# Patient Record
Sex: FEMALE | Race: WHITE | NOT HISPANIC OR LATINO | Employment: FULL TIME | URBAN - METROPOLITAN AREA
[De-identification: names, ages, dates, MRNs, and addresses within clinical notes are randomized per-mention and may not be internally consistent; named-entity substitution may affect disease eponyms.]

---

## 2019-02-25 ENCOUNTER — OFFICE VISIT - HEALTHEAST (OUTPATIENT)
Dept: FAMILY MEDICINE | Facility: CLINIC | Age: 39
End: 2019-02-25

## 2019-02-25 DIAGNOSIS — N94.6 DYSMENORRHEA: ICD-10-CM

## 2019-02-25 DIAGNOSIS — Z00.00 ROUTINE GENERAL MEDICAL EXAMINATION AT A HEALTH CARE FACILITY: ICD-10-CM

## 2019-02-25 DIAGNOSIS — R03.0 ELEVATED BLOOD PRESSURE READING WITHOUT DIAGNOSIS OF HYPERTENSION: ICD-10-CM

## 2019-02-25 DIAGNOSIS — Z83.3 FAMILY HISTORY OF DIABETES MELLITUS: ICD-10-CM

## 2019-02-25 DIAGNOSIS — Z12.4 CERVICAL CANCER SCREENING: ICD-10-CM

## 2019-02-25 DIAGNOSIS — R10.2 PELVIC PAIN: ICD-10-CM

## 2019-02-25 DIAGNOSIS — Z11.3 SCREEN FOR STD (SEXUALLY TRANSMITTED DISEASE): ICD-10-CM

## 2019-02-25 LAB
ALBUMIN SERPL-MCNC: 4.3 G/DL (ref 3.5–5)
ALP SERPL-CCNC: 79 U/L (ref 45–120)
ALT SERPL W P-5'-P-CCNC: 12 U/L (ref 0–45)
ANION GAP SERPL CALCULATED.3IONS-SCNC: 12 MMOL/L (ref 5–18)
AST SERPL W P-5'-P-CCNC: 12 U/L (ref 0–40)
BILIRUB SERPL-MCNC: 0.4 MG/DL (ref 0–1)
BUN SERPL-MCNC: 20 MG/DL (ref 8–22)
CALCIUM SERPL-MCNC: 10 MG/DL (ref 8.5–10.5)
CHLORIDE BLD-SCNC: 105 MMOL/L (ref 98–107)
CO2 SERPL-SCNC: 22 MMOL/L (ref 22–31)
CREAT SERPL-MCNC: 0.72 MG/DL (ref 0.6–1.1)
GFR SERPL CREATININE-BSD FRML MDRD: >60 ML/MIN/1.73M2
GLUCOSE BLD-MCNC: 81 MG/DL (ref 70–125)
HBA1C MFR BLD: 5.3 % (ref 3.5–6)
HIV 1+2 AB+HIV1 P24 AG SERPL QL IA: NEGATIVE
POTASSIUM BLD-SCNC: 4 MMOL/L (ref 3.5–5)
PROT SERPL-MCNC: 7.6 G/DL (ref 6–8)
SODIUM SERPL-SCNC: 139 MMOL/L (ref 136–145)
TSH SERPL DL<=0.005 MIU/L-ACNC: 1.43 UIU/ML (ref 0.3–5)

## 2019-02-25 ASSESSMENT — MIFFLIN-ST. JEOR: SCORE: 1562.86

## 2019-02-26 LAB
C TRACH DNA SPEC QL PROBE+SIG AMP: NEGATIVE
HPV SOURCE: NORMAL
HUMAN PAPILLOMA VIRUS 16 DNA: NEGATIVE
HUMAN PAPILLOMA VIRUS 18 DNA: NEGATIVE
HUMAN PAPILLOMA VIRUS FINAL DIAGNOSIS: NORMAL
HUMAN PAPILLOMA VIRUS OTHER HR: NEGATIVE
N GONORRHOEA DNA SPEC QL NAA+PROBE: NEGATIVE
SPECIMEN DESCRIPTION: NORMAL
T PALLIDUM AB SER QL: NEGATIVE

## 2019-02-27 LAB
HAV IGM SERPL QL IA: NEGATIVE
HBV CORE IGM SERPL QL IA: NEGATIVE
HBV SURFACE AG SERPL QL IA: NEGATIVE
HCV AB SERPL QL IA: NEGATIVE
HSV1 IGG SERPL QL IA: NEGATIVE
HSV2 IGG SERPL QL IA: NEGATIVE

## 2019-03-01 LAB
BKR LAB AP ABNORMAL BLEEDING: YES
BKR LAB AP BIRTH CONTROL/HORMONES: NORMAL
BKR LAB AP CERVICAL APPEARANCE: NORMAL
BKR LAB AP GYN ADEQUACY: NORMAL
BKR LAB AP GYN INTERPRETATION: NORMAL
BKR LAB AP HPV REFLEX: NORMAL
BKR LAB AP LMP: NORMAL
BKR LAB AP PATIENT STATUS: NORMAL
BKR LAB AP PREVIOUS ABNORMAL: NORMAL
BKR LAB AP PREVIOUS NORMAL: NORMAL
HIGH RISK?: YES
PATH REPORT.COMMENTS IMP SPEC: NORMAL
RESULT FLAG (HE HISTORICAL CONVERSION): NORMAL

## 2019-06-06 ENCOUNTER — OFFICE VISIT - HEALTHEAST (OUTPATIENT)
Dept: FAMILY MEDICINE | Facility: CLINIC | Age: 39
End: 2019-06-06

## 2019-06-06 DIAGNOSIS — M25.562 ACUTE PAIN OF LEFT KNEE: ICD-10-CM

## 2019-06-06 RX ORDER — IBUPROFEN 200 MG
200-400 TABLET ORAL
Status: SHIPPED | COMMUNITY
Start: 2019-06-06 | End: 2022-06-29

## 2019-06-06 ASSESSMENT — MIFFLIN-ST. JEOR: SCORE: 1552.08

## 2019-08-06 ENCOUNTER — HOSPITAL ENCOUNTER (OUTPATIENT)
Dept: ULTRASOUND IMAGING | Facility: HOSPITAL | Age: 39
Discharge: HOME | End: 2019-08-06
Attending: NURSE PRACTITIONER

## 2019-08-06 DIAGNOSIS — R10.2 PELVIC PAIN: ICD-10-CM

## 2019-11-14 ENCOUNTER — COMMUNICATION - HEALTHEAST (OUTPATIENT)
Dept: FAMILY MEDICINE | Facility: CLINIC | Age: 39
End: 2019-11-14

## 2019-11-14 DIAGNOSIS — N94.6 DYSMENORRHEA: ICD-10-CM

## 2019-11-23 ENCOUNTER — COMMUNICATION - HEALTHEAST (OUTPATIENT)
Dept: TELEHEALTH | Facility: CLINIC | Age: 39
End: 2019-11-23

## 2020-01-02 ENCOUNTER — OFFICE VISIT - HEALTHEAST (OUTPATIENT)
Dept: FAMILY MEDICINE | Facility: CLINIC | Age: 40
End: 2020-01-02

## 2020-01-02 DIAGNOSIS — Z00.00 ROUTINE GENERAL MEDICAL EXAMINATION AT A HEALTH CARE FACILITY: ICD-10-CM

## 2020-01-02 DIAGNOSIS — Z13.1 SCREENING FOR DIABETES MELLITUS: ICD-10-CM

## 2020-01-02 DIAGNOSIS — Z13.0 SCREENING FOR DEFICIENCY ANEMIA: ICD-10-CM

## 2020-01-02 DIAGNOSIS — F33.1 MODERATE EPISODE OF RECURRENT MAJOR DEPRESSIVE DISORDER (H): ICD-10-CM

## 2020-01-02 DIAGNOSIS — Z13.220 SCREENING FOR CHOLESTEROL LEVEL: ICD-10-CM

## 2020-01-02 DIAGNOSIS — F41.1 GAD (GENERALIZED ANXIETY DISORDER): ICD-10-CM

## 2020-01-02 DIAGNOSIS — N94.6 DYSMENORRHEA: ICD-10-CM

## 2020-01-02 LAB
CHOLEST SERPL-MCNC: 248 MG/DL
FASTING STATUS PATIENT QL REPORTED: YES
FASTING STATUS PATIENT QL REPORTED: YES
GLUCOSE BLD-MCNC: 87 MG/DL (ref 70–99)
HDLC SERPL-MCNC: 54 MG/DL
HGB BLD-MCNC: 13 G/DL (ref 12–16)
LDLC SERPL CALC-MCNC: 155 MG/DL
TRIGL SERPL-MCNC: 196 MG/DL

## 2020-01-02 ASSESSMENT — ANXIETY QUESTIONNAIRES
2. NOT BEING ABLE TO STOP OR CONTROL WORRYING: MORE THAN HALF THE DAYS
1. FEELING NERVOUS, ANXIOUS, OR ON EDGE: NEARLY EVERY DAY
3. WORRYING TOO MUCH ABOUT DIFFERENT THINGS: NEARLY EVERY DAY
7. FEELING AFRAID AS IF SOMETHING AWFUL MIGHT HAPPEN: MORE THAN HALF THE DAYS
GAD7 TOTAL SCORE: 16
6. BECOMING EASILY ANNOYED OR IRRITABLE: NEARLY EVERY DAY
IF YOU CHECKED OFF ANY PROBLEMS ON THIS QUESTIONNAIRE, HOW DIFFICULT HAVE THESE PROBLEMS MADE IT FOR YOU TO DO YOUR WORK, TAKE CARE OF THINGS AT HOME, OR GET ALONG WITH OTHER PEOPLE: SOMEWHAT DIFFICULT
4. TROUBLE RELAXING: NEARLY EVERY DAY
5. BEING SO RESTLESS THAT IT IS HARD TO SIT STILL: NOT AT ALL

## 2020-01-02 ASSESSMENT — PATIENT HEALTH QUESTIONNAIRE - PHQ9: SUM OF ALL RESPONSES TO PHQ QUESTIONS 1-9: 9

## 2020-01-02 ASSESSMENT — MIFFLIN-ST. JEOR: SCORE: 1554.35

## 2020-02-06 ENCOUNTER — OFFICE VISIT - HEALTHEAST (OUTPATIENT)
Dept: FAMILY MEDICINE | Facility: CLINIC | Age: 40
End: 2020-02-06

## 2020-02-06 DIAGNOSIS — F33.1 MODERATE EPISODE OF RECURRENT MAJOR DEPRESSIVE DISORDER (H): ICD-10-CM

## 2020-02-06 DIAGNOSIS — Z87.42 HISTORY OF PCOS: ICD-10-CM

## 2020-02-06 DIAGNOSIS — F41.1 GAD (GENERALIZED ANXIETY DISORDER): ICD-10-CM

## 2020-02-06 ASSESSMENT — ANXIETY QUESTIONNAIRES
7. FEELING AFRAID AS IF SOMETHING AWFUL MIGHT HAPPEN: NOT AT ALL
2. NOT BEING ABLE TO STOP OR CONTROL WORRYING: SEVERAL DAYS
1. FEELING NERVOUS, ANXIOUS, OR ON EDGE: SEVERAL DAYS
4. TROUBLE RELAXING: SEVERAL DAYS
IF YOU CHECKED OFF ANY PROBLEMS ON THIS QUESTIONNAIRE, HOW DIFFICULT HAVE THESE PROBLEMS MADE IT FOR YOU TO DO YOUR WORK, TAKE CARE OF THINGS AT HOME, OR GET ALONG WITH OTHER PEOPLE: NOT DIFFICULT AT ALL
3. WORRYING TOO MUCH ABOUT DIFFERENT THINGS: SEVERAL DAYS
6. BECOMING EASILY ANNOYED OR IRRITABLE: SEVERAL DAYS
GAD7 TOTAL SCORE: 5
5. BEING SO RESTLESS THAT IT IS HARD TO SIT STILL: NOT AT ALL

## 2020-02-06 ASSESSMENT — PATIENT HEALTH QUESTIONNAIRE - PHQ9: SUM OF ALL RESPONSES TO PHQ QUESTIONS 1-9: 4

## 2020-02-19 ENCOUNTER — COMMUNICATION - HEALTHEAST (OUTPATIENT)
Dept: FAMILY MEDICINE | Facility: CLINIC | Age: 40
End: 2020-02-19

## 2020-02-19 DIAGNOSIS — F33.1 MODERATE EPISODE OF RECURRENT MAJOR DEPRESSIVE DISORDER (H): ICD-10-CM

## 2020-02-19 DIAGNOSIS — F41.1 GAD (GENERALIZED ANXIETY DISORDER): ICD-10-CM

## 2020-03-16 ENCOUNTER — COMMUNICATION - HEALTHEAST (OUTPATIENT)
Dept: FAMILY MEDICINE | Facility: CLINIC | Age: 40
End: 2020-03-16

## 2020-03-16 DIAGNOSIS — F41.1 GAD (GENERALIZED ANXIETY DISORDER): ICD-10-CM

## 2020-03-16 DIAGNOSIS — F33.1 MODERATE EPISODE OF RECURRENT MAJOR DEPRESSIVE DISORDER (H): ICD-10-CM

## 2020-03-23 ENCOUNTER — COMMUNICATION - HEALTHEAST (OUTPATIENT)
Dept: FAMILY MEDICINE | Facility: CLINIC | Age: 40
End: 2020-03-23

## 2020-03-23 DIAGNOSIS — F33.1 MODERATE EPISODE OF RECURRENT MAJOR DEPRESSIVE DISORDER (H): ICD-10-CM

## 2020-03-23 DIAGNOSIS — F41.1 GAD (GENERALIZED ANXIETY DISORDER): ICD-10-CM

## 2020-03-30 ENCOUNTER — OFFICE VISIT - HEALTHEAST (OUTPATIENT)
Dept: FAMILY MEDICINE | Facility: CLINIC | Age: 40
End: 2020-03-30

## 2020-03-30 DIAGNOSIS — F41.1 GAD (GENERALIZED ANXIETY DISORDER): ICD-10-CM

## 2020-03-30 DIAGNOSIS — F51.04 PSYCHOPHYSIOLOGICAL INSOMNIA: ICD-10-CM

## 2020-03-30 DIAGNOSIS — F33.1 MODERATE EPISODE OF RECURRENT MAJOR DEPRESSIVE DISORDER (H): ICD-10-CM

## 2020-03-30 ASSESSMENT — ANXIETY QUESTIONNAIRES
3. WORRYING TOO MUCH ABOUT DIFFERENT THINGS: NOT AT ALL
1. FEELING NERVOUS, ANXIOUS, OR ON EDGE: SEVERAL DAYS
6. BECOMING EASILY ANNOYED OR IRRITABLE: SEVERAL DAYS
2. NOT BEING ABLE TO STOP OR CONTROL WORRYING: NOT AT ALL
7. FEELING AFRAID AS IF SOMETHING AWFUL MIGHT HAPPEN: SEVERAL DAYS
4. TROUBLE RELAXING: NOT AT ALL
5. BEING SO RESTLESS THAT IT IS HARD TO SIT STILL: NOT AT ALL
GAD7 TOTAL SCORE: 3
IF YOU CHECKED OFF ANY PROBLEMS ON THIS QUESTIONNAIRE, HOW DIFFICULT HAVE THESE PROBLEMS MADE IT FOR YOU TO DO YOUR WORK, TAKE CARE OF THINGS AT HOME, OR GET ALONG WITH OTHER PEOPLE: NOT DIFFICULT AT ALL

## 2020-03-30 ASSESSMENT — PATIENT HEALTH QUESTIONNAIRE - PHQ9: SUM OF ALL RESPONSES TO PHQ QUESTIONS 1-9: 2

## 2020-06-15 ENCOUNTER — COMMUNICATION - HEALTHEAST (OUTPATIENT)
Dept: FAMILY MEDICINE | Facility: CLINIC | Age: 40
End: 2020-06-15

## 2020-06-15 DIAGNOSIS — F41.1 GAD (GENERALIZED ANXIETY DISORDER): ICD-10-CM

## 2020-06-15 DIAGNOSIS — F33.1 MODERATE EPISODE OF RECURRENT MAJOR DEPRESSIVE DISORDER (H): ICD-10-CM

## 2020-06-16 ENCOUNTER — COMMUNICATION - HEALTHEAST (OUTPATIENT)
Dept: FAMILY MEDICINE | Facility: CLINIC | Age: 40
End: 2020-06-16

## 2020-06-16 DIAGNOSIS — F41.1 GAD (GENERALIZED ANXIETY DISORDER): ICD-10-CM

## 2020-06-16 DIAGNOSIS — F33.1 MODERATE EPISODE OF RECURRENT MAJOR DEPRESSIVE DISORDER (H): ICD-10-CM

## 2020-06-17 ENCOUNTER — COMMUNICATION - HEALTHEAST (OUTPATIENT)
Dept: FAMILY MEDICINE | Facility: CLINIC | Age: 40
End: 2020-06-17

## 2020-06-17 DIAGNOSIS — F41.1 GAD (GENERALIZED ANXIETY DISORDER): ICD-10-CM

## 2020-06-17 DIAGNOSIS — F33.1 MODERATE EPISODE OF RECURRENT MAJOR DEPRESSIVE DISORDER (H): ICD-10-CM

## 2020-10-11 ENCOUNTER — COMMUNICATION - HEALTHEAST (OUTPATIENT)
Dept: FAMILY MEDICINE | Facility: CLINIC | Age: 40
End: 2020-10-11

## 2020-10-11 DIAGNOSIS — N94.6 DYSMENORRHEA: ICD-10-CM

## 2020-12-17 ENCOUNTER — COMMUNICATION - HEALTHEAST (OUTPATIENT)
Dept: FAMILY MEDICINE | Facility: CLINIC | Age: 40
End: 2020-12-17

## 2020-12-17 DIAGNOSIS — F51.04 PSYCHOPHYSIOLOGICAL INSOMNIA: ICD-10-CM

## 2020-12-18 RX ORDER — TRAZODONE HYDROCHLORIDE 50 MG/1
TABLET, FILM COATED ORAL
Qty: 90 TABLET | Refills: 0 | Status: SHIPPED | OUTPATIENT
Start: 2020-12-18 | End: 2022-06-16

## 2021-02-22 ENCOUNTER — COMMUNICATION - HEALTHEAST (OUTPATIENT)
Dept: FAMILY MEDICINE | Facility: CLINIC | Age: 41
End: 2021-02-22

## 2021-02-22 DIAGNOSIS — R93.7 ABNORMAL X-RAY OF PELVIS: ICD-10-CM

## 2021-03-03 ENCOUNTER — HOSPITAL ENCOUNTER (OUTPATIENT)
Dept: RADIOLOGY | Facility: HOSPITAL | Age: 41
Discharge: HOME | End: 2021-03-03
Attending: NURSE PRACTITIONER

## 2021-03-03 DIAGNOSIS — R93.7 ABNORMAL X-RAY OF PELVIS: ICD-10-CM

## 2021-03-09 ENCOUNTER — COMMUNICATION - HEALTHEAST (OUTPATIENT)
Dept: FAMILY MEDICINE | Facility: CLINIC | Age: 41
End: 2021-03-09

## 2021-03-09 ENCOUNTER — COMMUNICATION - HEALTHEAST (OUTPATIENT)
Dept: INTERNAL MEDICINE | Facility: CLINIC | Age: 41
End: 2021-03-09

## 2021-03-09 DIAGNOSIS — F41.1 GAD (GENERALIZED ANXIETY DISORDER): ICD-10-CM

## 2021-03-09 DIAGNOSIS — F33.1 MODERATE EPISODE OF RECURRENT MAJOR DEPRESSIVE DISORDER (H): ICD-10-CM

## 2021-03-11 ENCOUNTER — COMMUNICATION - HEALTHEAST (OUTPATIENT)
Dept: FAMILY MEDICINE | Facility: CLINIC | Age: 41
End: 2021-03-11

## 2021-03-11 DIAGNOSIS — F41.1 GAD (GENERALIZED ANXIETY DISORDER): ICD-10-CM

## 2021-03-11 DIAGNOSIS — F33.1 MODERATE EPISODE OF RECURRENT MAJOR DEPRESSIVE DISORDER (H): ICD-10-CM

## 2021-03-24 ENCOUNTER — COMMUNICATION - HEALTHEAST (OUTPATIENT)
Dept: FAMILY MEDICINE | Facility: CLINIC | Age: 41
End: 2021-03-24

## 2021-03-24 DIAGNOSIS — N94.6 DYSMENORRHEA: ICD-10-CM

## 2021-04-29 ENCOUNTER — OFFICE VISIT - HEALTHEAST (OUTPATIENT)
Dept: FAMILY MEDICINE | Facility: CLINIC | Age: 41
End: 2021-04-29

## 2021-04-29 DIAGNOSIS — E66.09 OBESITY DUE TO EXCESS CALORIES WITHOUT SERIOUS COMORBIDITY, UNSPECIFIED CLASSIFICATION: ICD-10-CM

## 2021-04-29 DIAGNOSIS — Z12.31 ENCOUNTER FOR SCREENING MAMMOGRAM FOR BREAST CANCER: ICD-10-CM

## 2021-04-29 DIAGNOSIS — N94.6 DYSMENORRHEA: ICD-10-CM

## 2021-04-29 DIAGNOSIS — F41.1 GAD (GENERALIZED ANXIETY DISORDER): ICD-10-CM

## 2021-04-29 DIAGNOSIS — F33.1 MODERATE EPISODE OF RECURRENT MAJOR DEPRESSIVE DISORDER (H): ICD-10-CM

## 2021-04-29 RX ORDER — DESOGESTREL AND ETHINYL ESTRADIOL 0.15-0.03
KIT ORAL
Qty: 84 TABLET | Refills: 3 | Status: SHIPPED | OUTPATIENT
Start: 2021-04-29 | End: 2021-08-05

## 2021-04-29 RX ORDER — SERTRALINE HYDROCHLORIDE 25 MG/1
75 TABLET, FILM COATED ORAL DAILY
Qty: 270 TABLET | Refills: 3 | Status: SHIPPED | OUTPATIENT
Start: 2021-04-29 | End: 2022-06-16

## 2021-04-29 ASSESSMENT — PATIENT HEALTH QUESTIONNAIRE - PHQ9: SUM OF ALL RESPONSES TO PHQ QUESTIONS 1-9: 4

## 2021-04-30 ENCOUNTER — COMMUNICATION - HEALTHEAST (OUTPATIENT)
Dept: FAMILY MEDICINE | Facility: CLINIC | Age: 41
End: 2021-04-30

## 2021-04-30 DIAGNOSIS — F33.1 MODERATE EPISODE OF RECURRENT MAJOR DEPRESSIVE DISORDER (H): ICD-10-CM

## 2021-04-30 DIAGNOSIS — E66.9 OBESITY WITHOUT SERIOUS COMORBIDITY, UNSPECIFIED CLASSIFICATION, UNSPECIFIED OBESITY TYPE: ICD-10-CM

## 2021-05-01 RX ORDER — BUPROPION HYDROCHLORIDE 150 MG/1
150 TABLET ORAL DAILY
Qty: 30 TABLET | Refills: 1 | Status: SHIPPED | OUTPATIENT
Start: 2021-05-01 | End: 2022-06-16

## 2021-05-27 ENCOUNTER — COMMUNICATION - HEALTHEAST (OUTPATIENT)
Dept: FAMILY MEDICINE | Facility: CLINIC | Age: 41
End: 2021-05-27

## 2021-05-27 DIAGNOSIS — E66.9 OBESITY WITHOUT SERIOUS COMORBIDITY, UNSPECIFIED CLASSIFICATION, UNSPECIFIED OBESITY TYPE: ICD-10-CM

## 2021-05-27 DIAGNOSIS — F33.1 MODERATE EPISODE OF RECURRENT MAJOR DEPRESSIVE DISORDER (H): ICD-10-CM

## 2021-05-27 ASSESSMENT — PATIENT HEALTH QUESTIONNAIRE - PHQ9
SUM OF ALL RESPONSES TO PHQ QUESTIONS 1-9: 4
SUM OF ALL RESPONSES TO PHQ QUESTIONS 1-9: 2
SUM OF ALL RESPONSES TO PHQ QUESTIONS 1-9: 9
SUM OF ALL RESPONSES TO PHQ QUESTIONS 1-9: 4

## 2021-05-28 ASSESSMENT — ANXIETY QUESTIONNAIRES
GAD7 TOTAL SCORE: 16
GAD7 TOTAL SCORE: 5
GAD7 TOTAL SCORE: 3

## 2021-06-02 VITALS — BODY MASS INDEX: 28.64 KG/M2 | HEIGHT: 68 IN | WEIGHT: 189 LBS

## 2021-06-03 VITALS — WEIGHT: 187.5 LBS | HEIGHT: 67 IN | BODY MASS INDEX: 29.43 KG/M2

## 2021-06-03 NOTE — TELEPHONE ENCOUNTER
Refill Approved    Rx renewed per Medication Renewal Policy. Medication was last renewed on 2/25/19.    Libra Ramirez, [facility] Triage/Med Refill 11/14/2019     Requested Prescriptions   Pending Prescriptions Disp Refills     desogestrel-ethinyl estradiol (RECLIPSEN, 28,) 0.15-0.03 mg per tablet [Pharmacy Med Name: RECLIPSEN 0.15MG-30MCG TABS 28] 112 tablet 0     Sig: TAKE 1 TABLET BY MOUTH ONCE DAILY. BEGIN ON SUNDAY AS DIRECTED. TAKE HORMONALLY ACTIVE TABLETS CONTINUOUSLY FOR 3 MONTHS AT A TIME.       Oral Contraceptives Protocol Passed - 11/14/2019  8:52 AM        Passed - Visit with PCP or prescribing provider visit in last 12 months      Last office visit with prescriber/PCP: 2/25/2019 Nikki High CNP OR same dept: 6/6/2019 Aditya Singletary MD OR same specialty: 6/6/2019 Aditya Singletary MD  Last physical: Visit date not found Last MTM visit: Visit date not found   Next visit within 3 mo: Visit date not found  Next physical within 3 mo: Visit date not found  Prescriber OR PCP: Nikki High CNP  Last diagnosis associated with med order: 1. Dysmenorrhea  - RECLIPSEN, 28, 0.15-0.03 mg per tablet [Pharmacy Med Name: RECLIPSEN 0.15MG-30MCG TABS 28]; TAKE 1 TABLET BY MOUTH ONCE DAILY. BEGIN ON SUNDAY AS DIRECTED. TAKE HORMONALLY ACTIVE TABLETS CONTINUOUSLY FOR 3 MONTHS AT A TIME.  Dispense: 112 tablet; Refill: 0    If protocol passes may refill for 12 months if within 3 months of last provider visit (or a total of 15 months).

## 2021-06-04 VITALS
BODY MASS INDEX: 28.18 KG/M2 | SYSTOLIC BLOOD PRESSURE: 114 MMHG | WEIGHT: 184 LBS | HEART RATE: 68 BPM | DIASTOLIC BLOOD PRESSURE: 76 MMHG

## 2021-06-04 VITALS
DIASTOLIC BLOOD PRESSURE: 90 MMHG | HEART RATE: 80 BPM | HEIGHT: 68 IN | SYSTOLIC BLOOD PRESSURE: 122 MMHG | WEIGHT: 186.25 LBS | BODY MASS INDEX: 28.23 KG/M2

## 2021-06-04 NOTE — PROGRESS NOTES
FEMALE PREVENTIVE EXAM    Assessment & Plan   1. Routine general medical examination at a health care facility  Not due for pap. Fasting labs, HPV #2.  [facility] colon cancer in PGM and paternal aunt later in life.  Discussed CRC screening at age 44 . She will check with father on his screening and if polyps, earlier screening.   - HPV vaccine 9 valent 3 dose IM    2. Moderate episode of recurrent major depressive disorder (H)  Long-standing depression and generalized anxiety.  Has followed with a therapist for two years and made many lifestyle changes without significant improvement . Interested in medication management and discussed options . Will start on sertraline 25mg for one week and increase to 50mg. Recheck in 4-6 weeks.   - sertraline (ZOLOFT) 50 MG tablet; Take 1 tablet (50 mg total) by mouth daily.  Dispense: 30 tablet; Refill: 1    3. CHARANJIT (generalized anxiety disorder)  - sertraline (ZOLOFT) 50 MG tablet; Take 1 tablet (50 mg total) by mouth daily.  Dispense: 30 tablet; Refill: 1    4. Dysmenorrhea  Denies risk factors for OCP use  - RECLIPSEN, 28, 0.15-0.03 mg per tablet; TAKE 1 TABLET BY MOUTH ONCE DAILY. BEGIN ON SUNDAY AS DIRECTED. TAKE HORMONALLY ACTIVE TABLETS CONTINUOUSLY FOR 3 MONTHS AT A TIME.  Dispense: 84 tablet; Refill: 3    5. Screening for cholesterol level  - Lipid Cascade    6. Screening for diabetes mellitus  - Glucose    7. Screening for deficiency anemia  - Hemoglobin    Recommend repeat pap smear if normal every five years, Recommended adequate calcium intake/osteoporosis prevention, Discussed breast cancer screening guidelines and Discussed diet, including moderation of portions sizes, avoiding eating out and fast food and increase in fruits and vegetables    Nikki High CNP    Subjective:   Chief Complaint:  Annual Exam (fasting - not due for a pap); Depression (would like to discuss possible medications); and Anxiety    HPI:  Eri Forbes is a 39 y.o. female who presents for  routine physical exam.    BP:  122/90. Previously elevated systolic as well.  Feels this is largely due to anxiety.     Depression/anxiety: Describes symptoms since adolescence.  Has essentially lived with this for years.  Persistent, pervasive. Feeling low, chronic anhedonia.  Low self esteem. Anxious throughout the day.  Inhibiting her from doing normal things.  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  No h/o panic disorder. Rare acute anxiety episodes.  No clayton or hypomania symptoms. Sleep difficulties occasionally.  Now maybe once a week. Following with a therapist regularly. [facility].  Cammie Beckham.  [facility] depression in father, anxiety in mother.     Used sertraline at age 19 for a brief period of time. Does not recall response. Stopped d/t insurance change.      Dysmenorrhea: Well controlled on OCP. Spotting with generics of Reclipsen.     OB/Gyn History:  Menstrual history: regular periods   Date of previous pap: 2019  History of abnormal pap: none  Current Contraceptive method: OCP    Preventive Health:  Reviewed and recommended screening and treatment recommendations:  Immunizations: due for HPV #2    Health Habits:    Exercise: not regularly  Calcium intake/Osteoporosis prevention: yes    PMH:   Patient Active Problem List   Diagnosis   (none) - all problems resolved or deleted       No past medical history on file.    Current Medications: Reviewed   Current Outpatient Medications on File Prior to Visit   Medication Sig Dispense Refill     desogestrel-ethinyl estradiol (RECLIPSEN, 28,) 0.15-0.03 mg per tablet TAKE 1 TABLET BY MOUTH ONCE DAILY. BEGIN ON SUNDAY AS DIRECTED. TAKE HORMONALLY ACTIVE TABLETS CONTINUOUSLY FOR 3 MONTHS AT A TIME. 84 tablet 3     ibuprofen (ADVIL,MOTRIN) 200 MG tablet Take 200-400 mg by mouth.       multivitamin therapeutic tablet Take 1 tablet by mouth daily.       No current  "facility-administered medications on file prior to visit.        Allergies:  Reviewed  has No Known Allergies.    Social History:  Social History     Occupational History     Not on file   Tobacco Use     Smoking status: Former Smoker     Smokeless tobacco: Never Used   Substance and Sexual Activity     Alcohol use: Yes     Alcohol/week: 0.0 - 1.0 standard drinks     Drug use: No     Sexual activity: Yes     Partners: Male       Family History:   Family History   Problem Relation Age of Onset     Multiple sclerosis Mother      Diabetes Father      Heart disease Father 55        s/p bypass     Diabetes Paternal Aunt      Diabetes Paternal Uncle      Diabetes Paternal Grandmother      Colon cancer Paternal Grandfather 80     Breast cancer Neg Hx      Cancer Neg Hx      Prostate cancer Neg Hx          Review of Systems:  Complete head to toe review of systems is otherwise negative except as above.    Objective:    /90 (Patient Site: Left Arm, Patient Position: Sitting, Cuff Size: Adult Large)   Pulse 80   Ht 5' 7.75\" (1.721 m)   Wt 186 lb 4 oz (84.5 kg)   LMP 11/29/2019 (Approximate)   Breastfeeding No   BMI 28.53 kg/m      GENERAL: Alert, well-appearing female   PSYCH: Pleasant mood, affect appropriate.  Good judgment and insight.  Intact recent and remote memory. Good eye contact.  SKIN: No atypical lesions  EYES: Conjunctiva pink, sclera white, no exudates. CLARA.  EOMs intact.   EARS: TMs pearly grey, no bulging, redness, retraction.   MOUTH: Pharynx moist, pink without exudate. No tonsillar enlargement  NECK: No lymphadenopathy. Thyroid borders smooth without enlargement, nodules.   CV: Regular rate and rhythm without murmurs, rubs or gallops.  RESP: Lung sounds clear, symmetric excursion.   ABDOMEN: BS+. Abdomen soft.  No organomegaly  BREASTS: Breasts symmetric, no dimpling, masses or skin discolorations seen. Areolas and nipples symmetric without discharge. On palpation, breast tissue supple and " nontender. No masses or nodules. Axillary and epitrochlear lymph nodes nonpalpable.   PV :  No edema

## 2021-06-04 NOTE — PATIENT INSTRUCTIONS - HE
Recommendations from today's visit                                                       1. Depression/anxiety:  We will start sertraline at 25mg (1/2 tab) for one week and then increase to 50mg (1 tab) daily.  We will plan to recheck in 4-6 weeks.        Next appointment: 4-6 weeks     To reschedule your appointment, please call the clinic directly at [phone].   It was a pleasure seeing you today! I look forward to seeing you again.

## 2021-06-05 NOTE — PROGRESS NOTES
Assessment & Plan   1. Moderate episode of recurrent major depressive disorder (H)  Has noted significant improvement in particularly depression symptoms though continuing to struggle with high anxiety some days. No side effects noted.  Will try increasing dose to 75mg and recheck in six weeks, sooner as needed.    - sertraline (ZOLOFT) 50 MG tablet; Take 1.5 tablets (75 mg total) by mouth daily.  Dispense: 45 tablet; Refill: 1    2. CHARANJIT (generalized anxiety disorder)  As above, increase dose to 75mg.  Continue to follow with therapist  - sertraline (ZOLOFT) 50 MG tablet; Take 1.5 tablets (75 mg total) by mouth daily.  Dispense: 45 tablet; Refill: 1    3. History of PCOS  Discussed previous history and possibility of checking testosterone levels.  She will check with her insurance on whether or not this would be covered for her.     Nikki High CNP    Subjective   Chief Complaint:  No chief complaint on file.    HPI:   Eri Forbes is a 39 y.o. female who presents for follow up.     She has a history of long standing depression and generalized anxiety.  Has been following with a therapist regularly. At last visit one month ago was interested in starting medication and sertraline initiated.  She states she has noted fairly significant improvement in depression symptoms.  From the level of a 7 to a 4.  More energy, feeling more motivated. Anxiety continues to be up and down.  Overall better though still having some days when its as high as a 5 or 6.  Then other days when not present at all.  She has been able to focus more on healthy diet and exercise.     She also wonders about testosterone level and if this may be playing a role in her chronic depression.  States at one point as an adolescent she was told she had PCOS.  Polycystic appearance of ovaries. Did have acne. Irregular menstrual cycles. Unsure if testosterone was ever checked.     Allergies:  has No Known Allergies.    SH/FH:  Social History and  Family History reviewed and updated.   Tobacco Status:  She  reports that she has quit smoking. She has never used smokeless tobacco.    Review of Systems:  A complete head to toe ROS is negative unless otherwise noted in HPI    Objective   There were no vitals filed for this visit.    Physical Exam:  GENERAL: Alert, well-appearing female .   PSYCH: Pleasant mood, affect appropriate.  Good judgment and insight.  Intact recent and remote memory.  Good eye contact.

## 2021-06-06 NOTE — TELEPHONE ENCOUNTER
Refill Approved    Rx renewed per Medication Renewal Policy. Medication was last renewed on 2/6/20.    Liza Diez, [facility] Triage/Med Refill 2/21/2020     Requested Prescriptions   Pending Prescriptions Disp Refills     sertraline (ZOLOFT) 50 MG tablet [Pharmacy Med Name: SERTRALINE HCL 50 MG TABLET] 30 tablet 0     Sig: TAKE 1 TABLET BY MOUTH ONCE DAILY       [facility] Refill Protocol  Passed - 2/19/2020  7:36 AM        Passed - PCP or prescribing provider visit in last year     Last office visit with prescriber/PCP: 2/6/2020 Nikki High CNP OR same dept: 2/6/2020 Nikki High CNP OR same specialty: 2/6/2020 Nikki High CNP  Last physical: 1/2/2020 Last MTM visit: Visit date not found   Next visit within 3 mo: Visit date not found  Next physical within 3 mo: Visit date not found  Prescriber OR PCP: Nikki High CNP  Last diagnosis associated with med order: 1. Moderate episode of recurrent major depressive disorder (H)  - sertraline (ZOLOFT) 50 MG tablet [Pharmacy Med Name: SERTRALINE HCL 50 MG TABLET]; TAKE 1 TABLET BY MOUTH ONCE DAILY  Dispense: 30 tablet; Refill: 0    2. CHARANJIT (generalized anxiety disorder)  - sertraline (ZOLOFT) 50 MG tablet [Pharmacy Med Name: SERTRALINE HCL 50 MG TABLET]; TAKE 1 TABLET BY MOUTH ONCE DAILY  Dispense: 30 tablet; Refill: 0    If protocol passes may refill for 12 months if within 3 months of last provider visit (or a total of 15 months).

## 2021-06-07 NOTE — TELEPHONE ENCOUNTER
Received a fax from pharmacy stating that they need a 90 day supply faxed so insurance covers it.  Current rx does not reflect pts increase in dose.  Med set up.

## 2021-06-07 NOTE — TELEPHONE ENCOUNTER
Refilled. Please reach out and suggest phone visit as we never checked in after her last dose increase

## 2021-06-07 NOTE — PROGRESS NOTES
"Eri Forbes is a 39 y.o. female who is being evaluated via a billable telephone visit.      The patient has been notified of following:     \"This telephone visit will be conducted via a call between you and your physician/provider. We have found that certain health care needs can be provided without the need for a physical exam.  This service lets us provide the care you need with a short phone conversation.  If a prescription is necessary we can send it directly to your pharmacy.  If lab work is needed we can place an order for that and you can then stop by our lab to have the test done at a later time.    If during the course of the call the physician/provider feels a telephone visit is not appropriate, you will not be charged for this service.\"     Physician has received verbal consent for a Telephone Visit from the patient? Yes    Eri Forbes complains of anxiety and depression  Chief Complaint   Patient presents with     Anxiety     Depression     She has a history of depression and CHARANJIT.  Following with a therapist regularly but was still experiencing significant symptoms so started on sertraline in January of this year.  Fairly significant improvement in depression symptoms.  Feeling more motivated, better energy.  Anxiety continued to be intermittent.  Dose increased to 75mg in February.   She states she didn't notice a dramatic change.  She does feel anxiety is better but not sure if this is situational.  It has definitely improved.  Now working from home so not having the daily stress of waking up and going into the office.  Different stressors with COVID but feels like she is coping reasonably well.  She doesn't feel as isolated working from home as she thought she would.  Still feels depression is much better on the medication.    Is having difficulty falling asleep and staying asleep. This has been a chronic issue for her and she was hoping this would improve with treating depression " and anxiety but it has not.  Some nights difficulty falling asleep but most nights awakening several times throughout the night.  Has tried melatonin which helps with initiation but not maintenance.      I have reviewed and updated the patient's Past Medical History, Social History, Family History and Medication List.    ALLERGIES  Patient has no known allergies.    Additional provider notes:   Assessment/Plan:  1. Moderate episode of recurrent major depressive disorder (H)  Depression and anxiety symptoms have improved slightly with a dose increase in sertraline from 50-75mg. Overall she feels they are quite manageable right now. We discussed an increase to 100mg versus staying stable at 75mg and she feels comfortable keeping the dose stable for now.  Will reach out if she notes symptoms worsening, otherwise plan for recheck in six months.     2. CHARANJIT (generalized anxiety disorder)  As above, anxiety improved with sertraline.     3. Psychophysiological insomnia  Quite bothered by insomnia symptoms. Counseled on medication options including risks, benefits, side effects.  Will start on trazodone 25-50mg and asked her to send me a mychart in follow up to let me know how she is doing.    - traZODone (DESYREL) 50 MG tablet; Take 1 tablet (50 mg total) by mouth at bedtime.  Dispense: 90 tablet; Refill: 1      Phone call duration:  15 minutes    Laura Dumont, [facility]

## 2021-06-07 NOTE — TELEPHONE ENCOUNTER
Spoke with pt and informed her that if she is ok with it, we will change her visit on Monday 3/30 at 8:40 to a phone visit.  Pt agreed.  Pt informed me that her [facility]raline [facility] [employer] [facility] [address] in [place].  Informed pt I would cancel rx at the [facility] in [place] and verbally call in.

## 2021-06-08 NOTE — TELEPHONE ENCOUNTER
Refill Approved    Rx renewed per Medication Renewal Policy. Medication was last renewed on 3/23/20.    Noa Melendez, [facility] Triage/Med Refill 6/17/2020     Requested Prescriptions   Pending Prescriptions Disp Refills     sertraline (ZOLOFT) 50 MG tablet [Pharmacy Med Name: SERTRALINE HCL 50 MG TABLET] 135 tablet 0     Sig: TAKE 1 AND 1/2 TABLET (75MG) BY MOUTH DAILY       [facility] Refill Protocol  Passed - 6/15/2020 12:32 PM        Passed - PCP or prescribing provider visit in last year     Last office visit with prescriber/PCP: 2/6/2020 Nikki High CNP OR same dept: 2/6/2020 Nikki High CNP OR same specialty: 2/6/2020 Nikki High CNP  Last physical: 1/2/2020 Last MTM visit: Visit date not found   Next visit within 3 mo: Visit date not found  Next physical within 3 mo: Visit date not found  Prescriber OR PCP: Nikki High CNP  Last diagnosis associated with med order: 1. Moderate episode of recurrent major depressive disorder (H)  - sertraline (ZOLOFT) 50 MG tablet [Pharmacy Med Name: SERTRALINE HCL 50 MG TABLET]; TAKE 1 AND 1/2 TABLET (75MG) BY MOUTH DAILY  Dispense: 135 tablet; Refill: 0    2. CHARANJIT (generalized anxiety disorder)  - sertraline (ZOLOFT) 50 MG tablet [Pharmacy Med Name: SERTRALINE HCL 50 MG TABLET]; TAKE 1 AND 1/2 TABLET (75MG) BY MOUTH DAILY  Dispense: 135 tablet; Refill: 0    If protocol passes may refill for 12 months if within 3 months of last provider visit (or a total of 15 months).

## 2021-06-08 NOTE — TELEPHONE ENCOUNTER
Medication Question or Clarification  Who is calling: Pharmacy  What medication are you calling about (include dose and sig)?: SERTRALINE HCL 50MG TABLET  Who prescribed the medication?: Nikki High  What is your question/concern?: Note from pharmacy: ALTERNATIVE REQUESTED: PLEASE SEND US NEW RX FOR SERTRALINE 25MG TABLET, TAKE 3 TABS (75MG) BY MOUTH DAILY. CURRENTLY 50MG TABLETS ARE ON BACKORDER WITH NO RELEASE DATE.  [facility]  Okay to leave a detailed message?: No

## 2021-06-09 NOTE — TELEPHONE ENCOUNTER
Please see note below: Patient is taking 75mg daily.  Note from pharmacy: ALTERNATIVE REQUESTED: PLEASE SEND US NEW RX FOR SERTRALINE 25MG TABLET, TAKE 3 TABS (75MG) BY MOUTH DAILY. CURRENTLY 50MG TABLETS ARE ON BACKORDER WITH NO RELEASE DATE.  Requested Pharmacy: [facility]

## 2021-06-12 NOTE — TELEPHONE ENCOUNTER
Refill Approved    Rx renewed per Medication Renewal Policy. Medication was last renewed on 1/2/20.    Noa Melendez, [facility] Triage/Med Refill 10/13/2020     Requested Prescriptions   Pending Prescriptions Disp Refills     RECLIPSEN, 28, 0.15-0.03 mg per tablet [Pharmacy Med Name: RECLIPSEN 28 DAY TABLET] 112 tablet 2     Sig: TAKE 1 ACTIVE TABLET BY MOUTH DAILY CONTINUOUSLY. BEGIN ON SUNDAY AS DIRECTED       Oral Contraceptives Protocol Passed - 10/11/2020  9:12 AM        Passed - Visit with PCP or prescribing provider visit in last 12 months      Last office visit with prescriber/PCP: 2/6/2020 Nikki High CNP OR same dept: 2/6/2020 Nikki High CNP OR same specialty: 2/6/2020 Nikki High CNP  Last physical: 1/2/2020 Last MTM visit: Visit date not found   Next visit within 3 mo: Visit date not found  Next physical within 3 mo: Visit date not found  Prescriber OR PCP: Nikki High CNP  Last diagnosis associated with med order: 1. Dysmenorrhea  - RECLIPSEN, 28, 0.15-0.03 mg per tablet [Pharmacy Med Name: RECLIPSEN 28 DAY TABLET]; TAKE 1 ACTIVE TABLET BY MOUTH DAILY CONTINUOUSLY. BEGIN ON SUNDAY AS DIRECTED  Dispense: 112 tablet; Refill: 2    If protocol passes may refill for 12 months if within 3 months of last provider visit (or a total of 15 months).

## 2021-06-13 NOTE — TELEPHONE ENCOUNTER
Refill Approved    Rx renewed per Medication Renewal Policy. Medication was last renewed on 3/30/20.    Noa Melendez, [facility] Triage/Med Refill 12/18/2020     Requested Prescriptions   Pending Prescriptions Disp Refills     traZODone (DESYREL) 50 MG tablet [Pharmacy Med Name: TRAZODONE 50 MG TABLET] 90 tablet 1     Sig: TAKE 1 TABLET BY MOUTH EVERYDAY AT BEDTIME       Tricyclics/Misc Antidepressant/Antianxiety Meds Refill Protocol Passed - 12/17/2020  2:53 AM        Passed - PCP or prescribing provider visit in last year     Last office visit with prescriber/PCP: 2/6/2020 Nikki High CNP OR same dept: 2/6/2020 Nikki High CNP OR same specialty: 2/6/2020 Nikki High CNP  Last physical: 1/2/2020 Last MTM visit: Visit date not found   Next visit within 3 mo: Visit date not found  Next physical within 3 mo: Visit date not found  Prescriber OR PCP: Nikki High CNP  Last diagnosis associated with med order: 1. Psychophysiological insomnia  - traZODone (DESYREL) 50 MG tablet [Pharmacy Med Name: TRAZODONE 50 MG TABLET]; TAKE 1 TABLET BY MOUTH EVERYDAY AT BEDTIME  Dispense: 90 tablet; Refill: 1    If protocol passes may refill for 12 months if within 3 months of last provider visit (or a total of 15 months).

## 2021-06-15 NOTE — TELEPHONE ENCOUNTER
Prior Authorization Request  Who s requesting:  Pharmacy  [facility]  Medication Name: SERTRALINE 25MG  Insurance Plan: [email or web address]  Insurance Member ID Number:  [id number]  CoverMyMeds Key: N/A  Informed patient that prior authorizations can take up to 10 business days for response:   No  Okay to leave a detailed message: No        Note from pharmacy: Needs PA for 3 tabs daily

## 2021-06-16 PROBLEM — F41.1 GAD (GENERALIZED ANXIETY DISORDER): Status: ACTIVE | Noted: 2020-02-06

## 2021-06-16 PROBLEM — F33.1 MODERATE EPISODE OF RECURRENT MAJOR DEPRESSIVE DISORDER (H): Status: ACTIVE | Noted: 2020-02-06

## 2021-06-16 PROBLEM — Z87.42 HISTORY OF PCOS: Status: ACTIVE | Noted: 2020-02-06

## 2021-06-16 NOTE — TELEPHONE ENCOUNTER
Refill Approved    Rx renewed per Medication Renewal Policy. Medication was last renewed on 10/13/20.    Saran Alonso, [facility] Triage/Med Refill 3/24/2021     Requested Prescriptions   Pending Prescriptions Disp Refills     RECLIPSEN, 28, 0.15-0.03 mg per tablet [Pharmacy Med Name: RECLIPSEN 28 DAY TABLET] 112 tablet 1     Sig: TAKE 1 ACTIVE TABLET BY MOUTH DAILY CONTINUOUSLY. BEGIN ON SUNDAY AS DIRECTED       Oral Contraceptives Protocol Passed - 3/24/2021 12:09 AM        Passed - Visit with PCP or prescribing provider visit in last 12 months      Last office visit with prescriber/PCP: 2/6/2020 Nikki High CNP OR same dept: Visit date not found OR same specialty: 2/6/2020 Nikki High CNP  Last physical: 1/2/2020 Last MTM visit: Visit date not found   Next visit within 3 mo: Visit date not found  Next physical within 3 mo: Visit date not found  Prescriber OR PCP: Nikki High CNP  Last diagnosis associated with med order: 1. Dysmenorrhea  - RECLIPSEN, 28, 0.15-0.03 mg per tablet [Pharmacy Med Name: RECLIPSEN 28 DAY TABLET]; TAKE 1 ACTIVE TABLET BY MOUTH DAILY CONTINUOUSLY. BEGIN ON SUNDAY AS DIRECTED  Dispense: 112 tablet; Refill: 1    If protocol passes may refill for 12 months if within 3 months of last provider visit (or a total of 15 months).

## 2021-06-16 NOTE — TELEPHONE ENCOUNTER
Central PA team  [phone]  Pool: HE PA MED ([id number])          PA has been initiated.       PA form completed and faxed insurance via Cover My Meds     Key:  [id number] - PA Case ID: [id number]     Medication:  Sertraline HCl 25MG tablets    Insurance:  Marisela Sheffield        Response will be received via fax and may take up to 5-10 business days depending on plan

## 2021-06-17 NOTE — PROGRESS NOTES
Eri Forbes is a 40 y.o. female who is being evaluated via a billable video visit.      How would you like to obtain your AVS? MyChart.  If dropped from the video visit, the video invitation should be resent by: Text to cell phone: [phone]  Will anyone else be joining your video visit? No      Video Start Time: 8:10A    1. Moderate episode of recurrent major depressive disorder (H)  Depression and anxiety well controlled with sertraline 75mg daily.  Refilled for one year, recheck at that time or sooner as needed.    - sertraline (ZOLOFT) 25 MG tablet; Take 3 tablets (75 mg total) by mouth daily.  Dispense: 270 tablet; Refill: 3    2. CHARANJIT (generalized anxiety disorder)  - sertraline (ZOLOFT) 25 MG tablet; Take 3 tablets (75 mg total) by mouth daily.  Dispense: 270 tablet; Refill: 3    3. Dysmenorrhea  Counseled on risks of OCP.  Her only risk factor for use is weight which is not an absolute contraindication. She is accepting now of risk for VTE though does desire to switch to something else within the next few years. Counseled on progestin only options as well as Paragard, though this would not be helpful for her periods.  She is also considering tubal ligation though again this would not be helpful for her periods.  She will think about options and let me know when she has decided.  Until then, continue OCP  - RECLIPSEN, 28, 0.15-0.03 mg per tablet; Take one tablet by mouth daily  Dispense: 84 tablet; Refill: 3    4. Obesity due to excess calories without serious comorbidity, unspecified classification  Discussed weight management and current patterns of eating.  She is overeating at night though not necessarily binge eating and no purging present.  She does experience strong cravings for food that she finds hard to overcome.  We discussed that her caloric intake per day is too low for her body mass and this is likely playing a role in her intense cravings in the evening. Suggested front-loading  calories more and focusing on adding in vegetables, fruits and healthy fats. We briefly discussed medications of bupropion, Topamax and phentermine. Will plan to re-visit in two months to discuss further and if she is still struggling consider starting medication. She is agreeable.      5. Encounter for screening mammogram for breast cancer  Discussed various guidelines.  She would like to begin screening this year.    - Mammo Screening Bilateral; Future    Total Time: 56 minutes spent on the day of the encounter doing chart review, patient visit, documentation and consultation.     Subjective   Eri Forbes is 40 y.o. and presents today for the following health issues   HPI     Depression/anxiety:  Continues on sertraline 75mg.  She feels depression and anxiety have been very well controlled the past year.  Significant improvement in mood. Enjoying new job.  Feels somewhat isolated at home though has a new boyfriend so this has been helping.  Sleeping well at night. Does have trazodone prescription though has not had to take this for some time.      Weight:  Wondering about medication to assist with weight loss.   Has gained 20 pounds in the past year. Now 200 pounds.  States she is experiencing intense cravings around 6pm and eating things she can normally resist like a pint of ice cream. Does not eat past the point of full, purge or experience feelings of guilt.   Goes to gym after work.  AM coffee, egg (or other protein) at 10.  Lunch is chicken. Snacks: almonds, popcorn, carrots.  Dinner:  typical meal and then overeating. Estimates 1400 calories/day.  Established with  this week.     Contraception:  Continues on 30mcg OCP.  Pregnancy scare in January. She states she has no desire for pregnancy and now interested in more long-term or permanent options . Would like to discuss risks with OCP again.  Non-smoker. Migraines >15 years ago though no aura symptoms.  No FMH VTE.  Periods are heavy and  painful when she is not on an OCP.       Review of Systems  Negative unless otherwise noted in HPI      Objective       Vitals:  No vitals were obtained today due to virtual visit.    Physical Exam  Alert, well appearing. Pleasant mood, affect appropriate.        Video-Visit Details    Type of service:  Video Visit    Video End Time (time video stopped): 8:49A  Originating Location (pt. Location): Home    Distant Location (provider location):  [facility]     Platform used for Video Visit: [employer]

## 2021-06-17 NOTE — TELEPHONE ENCOUNTER
Telephone Encounter by Tabby Mejía at 3/17/2021  8:25 AM     Author: Tabby Mejía Service: -- Author Type: --    Filed: 3/17/2021  8:25 AM Encounter Date: 3/11/2021 Status: Signed    : Tabby Mejía APPROVED:    Approval start date: 03/16/2021  Approval end date:  03/16/2022    Pharmacy has been notified of approval and will contact patient when medication is ready for pickup.

## 2021-06-20 NOTE — LETTER
Letter by Nikki High CNP at      Author: Nikki High CNP Service: -- Author Type: --    Filed:  Encounter Date: 2/6/2020 Status: (Other)                    My Depression Action Plan  Name: Eri Forbes   Date of Birth 1980  Date: 2/6/2020    My Doctor: Nikki High CNP   My Clinic: [facility]  [address] [facility] [address]  [phone]          GREEN    ZONE   Good Control    What it looks like:     Things are going generally well. You have normal ups and downs. You may even feel depressed from time to time, but bad moods usually last less than a day.   What you need to do:  1. Continue to care for yourself (see self care plan)  2. Check your depression survival kit and update it as needed  3. Follow your physicians recommendations including any medication.  4. Do not stop taking medication unless you consult with your physician first.           YELLOW         ZONE Getting Worse    What it looks like:     Depression is starting to interfere with your life.     It may be hard to get out of bed; you may be starting to isolate yourself from others.    Symptoms of depression are starting to last most all day and this has happened for several days.     You may have suicidal thoughts but they are not constant.   What you need to do:     1. Call your care team. Your response to treatment will improve if you keep your care team informed of your progress. Yellow periods are signs an adjustment may need to be made.     2. Continue your self-care.  Just get dressed and ready for the day.  Don't give yourself time to talk yourself out of it.    3. Talk to someone in your support network.    4. Open up your depression Depression Self-Care Plan / Wellness kit.           RED    ZONE Medical Alert - Get Help    What it looks like:     Depression is seriously interfering with your life.     You may experience these or other symptoms: You cant get out of bed most days, cant  work or engage in other necessary activities, you have trouble taking care of basic hygiene, or basic responsibilities, thoughts of suicide or death that will not go away, self-injurious behavior.     What you need to do:  1. Call your care team and request a same-day appointment. If they are not available (weekends or after hours) call your local crisis line, emergency room or 911.            Self-Care Plan / Wellness Kit    Self-Care for Depression  Heres the deal. Your body and mind are really not as separate as most people think.  What you do and think affects how you feel and how you feel influences what you do and think. This means if you do things that people who feel good do, it will help you feel better.  Sometimes this is all it takes.  There is also a place for medication and therapy depending on how severe your depression is, so be sure to consult with your medical provider and/ or Behavioral Health Consultant if your symptoms are worsening or not improving.     In order to better manage my stress, I will:    Exercise  Get some form of exercise, every day. This will help reduce pain and release endorphins, the feel good chemicals in your brain. This is almost as good as taking antidepressants!  This is not the same as joining a gym and then never going! (they count on that by the way?) It can be as simple as just going for a walk or doing some gardening, anything that will get you moving.      Hygiene   Maintain good hygiene (get out of bed in the morning, make your bed, brush your teeth, take a shower, and get dressed like you were going to work, even if you are unemployed).  If your clothes don't fit try to get ones that do.    Diet  Strive to eat foods that are good for me, drink plenty of water, and avoid excessive sugar, caffeine, alcohol, and other mood-altering substances.  Some foods that are helpful in depression are: complex carbohydrates, B vitamins, flaxseed, fish or fish oil, fresh fruits  and vegetables.    Psychotherapy  Agree to participate in Individual Therapy (if recommended).    Medication  If prescribed medications, I agree to take them.  Missing doses can result in serious side effects.  I understand that drinking alcohol, or other illicit drug use, may cause potential side effects.  I will not stop my medication abruptly without first discussing it with my provider.    Staying Connected With Others  Stay in touch with my friends, family members, and my primary care provider/team.    Use your imagination  Be creative.  We all have a creative side; it doesnt matter if its oil painting, sand castles, or mud pies! This will also kick up the endorphins.    Witness Beauty  (AKA stop and smell the roses) Take a look outside, even in mid-winter. Notice colors, textures. Watch the squirrels and birds.     Service to others  Be of service to others.  There is always someone else in need.  By helping others we can get out of ourselves and remember the really important things.  This also provides opportunities for practicing all the other parts of the program.    Humor  Laugh and be silly!  Adjust your TV habits for less news and crime-drama and more comedy.    Control your stress  Try breathing deep, massage therapy, biofeedback, and meditation. Find time to relax each day.     Crisis Text Line  http://www.crisistextline.org    The Crisis Text Line serves anyone, in any type of crisis, providing access to free, 24/7 support and information via the medium people already use and trust:    Here's how it works:  1.  Text 610-339 from anywhere in the [place], anytime, about any type of crisis.  2.  A live, trained Crisis Counselor receives the text and responds quickly.  3.  The volunteer Crisis Counselor will help you move from a 'hot moment to a cool moment'.  My support system    Clinic Contact: Nikki High DNP Phone number: [phone]   Contact 1:  Phone number:    Contact 2:  Phone number:     [facility]/:  Phone number:    Therapist:  Phone number:    [facility] crisis center:    Phone number:    Other community support:  Phone number:

## 2021-06-24 ENCOUNTER — COMMUNICATION - HEALTHEAST (OUTPATIENT)
Dept: FAMILY MEDICINE | Facility: CLINIC | Age: 41
End: 2021-06-24

## 2021-06-24 NOTE — PATIENT INSTRUCTIONS - HE
Recommendations from today's visit                                                       1.  We will restart her birth control today for uncomfortable menstrual periods and emotional changes associated with her menstrual cycle.  You may start this at any time, however are less likely to experience irregular bleeding if you start the pill pack on the first day of your period.  About the prescription for continuous cycling every 3 months.    2.  We will check pelvic ultrasound to ensure that you have not developed another cyst on the left side.    3.  He received her first HPV vaccine today.  You will be due for the second vaccine anytime after 1 month and the third vaccine anytime after 6 months.  You can schedule a nurse only visit for these appointments.    4.  Blood pressure:  This is still mildly elevated.  I would like to see you back in two months to re-assess.  This is especially important to do with you being on the birth control.      Next appointment: One year, annual physical    To reschedule your appointment, please call the clinic directly at [phone].   It was a pleasure seeing you today! I look forward to seeing you again.

## 2021-06-24 NOTE — PROGRESS NOTES
FEMALE PREVENTIVE EXAM    Assessment & Plan   1. Routine general medical examination at a health care facility  Nonfasting labs and Pap.  - HPV vaccine 9 valent 3 dose IM; Standing  - HPV vaccine 9 valent 3 dose IM    2. Dysmenorrhea  Discussed treatment of dysmenorrhea.  She is previously experienced good control on oral contraceptive.  We will restart this today tentatively with the plan to recheck her blood pressure in 2 months.  She believes that stress is largely playing a role in this today.  No prior history of hypertension.  - desogestrel-ethinyl estradiol (RECLIPSEN, 28,) 0.15-0.03 mg per tablet; TAKE 1 TABLET BY MOUTH ONCE DAILY. BEGIN ON SUNDAY AS DIRECTED. TAKE HORMONALLY ACTIVE PILLS CONTINUOUSLY FOR 3 MONTHS AT A TIME  Dispense: 112 tablet; Refill: 3    3. Pelvic pain  Ultrasound to assess left-sided pelvic pain given her history of previous complex cyst with right-sided salpingo-oophorectomy.  - US Pelvis With Transvaginal Non OB; Future    4. Elevated blood pressure reading without diagnosis of hypertension  Exercising regularly and healthy diet.  She does have a family history of hypertension in her father with early heart disease.  Will check labs and she will return for a visit in 2 months to recheck the blood pressure.  If persistently elevated would consider oral contraceptive prescription.  - Thyroid [place]  - Comprehensive Metabolic Panel    5. Cervical cancer screening    - Gynecologic Cytology (PAP Smear)    6. Family history of diabetes mellitus    - Glycosylated Hemoglobin A1c    7. Screen for STD (sexually transmitted disease)    - Chlamydia trachomatis & Neisseria gonorrhoeae, Amplified Detection  - Herpes simplex Virus (Type 1 and 2) IgG Antibody  - HIV Antigen/Antibody Screening [place]  - Syphilis Screen, [facility]  - Hepatitis Acute Evaluation    Recommend repeat pap smear if normal every five years, Recommended adequate calcium intake/osteoporosis prevention, Discussed breast cancer  screening guidelines, Discussed safe sex practices and Discussed diet, including moderation of portions sizes, avoiding eating out and fast food and increase in fruits and vegetables    Nikki High CNP    Subjective:   Chief Complaint:  Annual Exam (renew birth control and pap.  Currently using menstrual cup.  Having discomfort on left side.  Pt has endometrosis. Has high BP initial maybe due to weight gain.  Having painful periods and getting progressively worse. )    HPI:  Eri Forbes is a 38 y.o. female who presents for routine physical exam.  New patient, establishing care.  PMH negative for chronic concern.  She works as a director of [employer].     Blood pressure: Today 132/94.  No prior history of diagnosed hypertension.  She states she has gained some weight in the last couple of years.  Not currently exercising.    Menstrual periods: Complains of progressively more painful periods for the past year.  Previously better controlled when she was on an oral contraceptive.  She also notes intermittent left-sided discomfort that comes and goes every couple of weeks.  Low in the pelvis on the left side.  She does have a history of endometriosis as well as ovarian cyst.   Previous ovarian cyst in 2011, ultrasound showed complex mass so was referred to OB/GYN.  Underwent right salpingo-oophorectomy for benign tumor.  Periods are regular though again painful.  She states she has also noted significant mood changes with her period as well.  More irritable, on edge.  Weepy.      She has also noted slow progressive weight gain in the last year.  Family history of diabetes in father and several paternal aunts and uncles. Blood pressure elevated today 146/88.  She states her diastolic is typically elevated though has never been treated for HTN.  Is exercising regularly at least 3-4 times a week.  Believes diet is very healthy.  She is working with a dietitian and trying to make some adjustments.  Largely  plant-based without processed foods.    OB/Gyn History:  Menstrual history: See HPI  Date of previous pap: 2015 without cotesting  History of abnormal pap: none  Current Contraceptive method: not currently sexually active    Preventive Health:  Reviewed and recommended screening and treatment recommendations:  Mammography: No FMH  Colonoscopy: [facility] of colon cancer in PGF at age 79  Immunizations: up to date    Health Habits:    Exercise: yes, regularly.  Calcium intake/Osteoporosis prevention: yes    PMH:   Patient Active Problem List   Diagnosis   (none) - all problems resolved or deleted       No past medical history on file.    Current Medications: Reviewed   Current Outpatient Medications on File Prior to Visit   Medication Sig Dispense Refill     desogestrel-ethinyl estradiol (RECLIPSEN, 28,) 0.15-0.03 mg per tablet TAKE 1 TABLET BY MOUTH ONCE DAILY. BEGIN ON SUNDAY AS DIRECTED. TAKE HORMONALLY ACTIVE PILLS CONTINUOUSLY FOR 3 MONTHS AT A TIME 112 tablet 0     No current facility-administered medications on file prior to visit.        Allergies:  Reviewed  has No Known Allergies.    Social History:  Social History     Occupational History     Not on file   Tobacco Use     Smoking status: Former Smoker     Smokeless tobacco: Never Used   Substance and Sexual Activity     Alcohol use: Yes     Alcohol/week: 0.0 - 0.6 oz     Drug use: No     Sexual activity: Yes     Partners: Male       Family History:   Family History   Problem Relation Age of Onset     Multiple sclerosis Mother      Diabetes Father      Heart disease Father         s/p bypass     Diabetes Paternal Aunt      Diabetes Paternal Uncle      Diabetes Paternal Grandmother      Colon cancer Paternal Grandfather 80     Breast cancer Neg Hx      Cancer Neg Hx      Prostate cancer Neg Hx          Review of Systems:  Complete head to toe review of systems is otherwise negative except as above.    Objective:    BP (!) 132/94 (Patient Site: Left Arm, Patient  "Position: Sitting, Cuff Size: Adult Regular)   Pulse 75   Resp 12   Ht 5' 7.5\" (1.715 m)   Wt 189 lb (85.7 kg)   LMP 02/03/2019 (Exact Date)   SpO2 98%   Breastfeeding? No   BMI 29.16 kg/m      GENERAL: Alert, well-appearing female.   PSYCH: Pleasant mood, affect appropriate.    SKIN: No atypical lesions  EYES: Conjunctiva pink, sclera white, no exudates. CLARA.  EOMs intact.   EARS: TMs pearly grey, no bulging, redness, retraction.   MOUTH: Pharynx moist, pink without exudate. No tonsillar enlargement  NECK: No lymphadenopathy. Thyroid borders smooth without enlargement, nodules.   CV: Regular rate and rhythm without murmurs, rubs or gallops.  RESP: Lung sounds clear  ABDOMEN: BS+. Abdomen soft.  No organomegaly  BREASTS: Breasts symmetric, no dimpling, masses or skin discolorations seen. Areolas and nipples symmetric without discharge. On palpation, breast tissue supple and nontender. No masses or nodules. Axillary and epitrochlear lymph nodes nonpalpable.    FEMALE: External genitalia without lesions. Vaginal walls and cervix without lesions or masses. No abnormal discharge. Pap smear obtained. On bimanual palpation, uterus mobile, normal shape and contour. No adnexal masses or tenderness.   PV :  No edema        "

## 2021-06-26 ENCOUNTER — HEALTH MAINTENANCE LETTER (OUTPATIENT)
Age: 41
End: 2021-06-26

## 2021-06-26 ENCOUNTER — COMMUNICATION - HEALTHEAST (OUTPATIENT)
Dept: FAMILY MEDICINE | Facility: CLINIC | Age: 41
End: 2021-06-26

## 2021-06-26 DIAGNOSIS — E66.9 OBESITY WITHOUT SERIOUS COMORBIDITY, UNSPECIFIED CLASSIFICATION, UNSPECIFIED OBESITY TYPE: ICD-10-CM

## 2021-06-26 DIAGNOSIS — F33.1 MODERATE EPISODE OF RECURRENT MAJOR DEPRESSIVE DISORDER (H): ICD-10-CM

## 2021-06-27 NOTE — PROGRESS NOTES
"Progress Notes by Aditya Singletary MD at 6/6/2019  4:00 PM     Author: Aditya Singletary MD Service: -- Author Type: Physician    Filed: 6/6/2019  4:42 PM Encounter Date: 6/6/2019 Status: Signed    : Aditya Singletary MD (Physician)       OFFICE VISIT - FAMILY MEDICINE     ASSESSMENT AND PLAN     1. Acute pain of left knee     Acute left  knee pain, differential diagnosis discussed, included internal organ derangement, suspecting pedis anserine bursitis, we discussed about treatment with activity modification, ice, knee brace, elevation and return if he felt improved in the next couple of weeks, sooner if her symptoms get worse.  CHIEF COMPLAINT   Knee Pain (lt knee, started running about 3 weeks, woke sunday morning with knee pain. Has taken ibuprofen for pain-with relief.)    [facility]   Eri Forbes is a 38 y.o. female.  No Patient Care Coordination Note on file.    Currently doing some running exercises, last week she went hiking, no direct injury to the knee but she woke up with pain in the medial aspect of her left knee, mild to moderate, throbbing, no swelling, ibuprofen though seems to help.  No prior issue with her knees.      Review of Systems As per HPI, otherwise negative.    OBJECTIVE   /90 (Patient Site: Left Arm, Patient Position: Sitting, Cuff Size: Adult Regular)   Pulse 88   Ht 5' 7.25\" (1.708 m)   Wt 187 lb 8 oz (85 kg)   SpO2 98%   BMI 29.15 kg/m    Physical Exam   Constitutional: She appears well-developed and well-nourished.   Musculoskeletal:        Legs:  Psychiatric: She has a normal mood and affect. Thought content normal.       [facility]     Family History   Problem Relation Age of Onset   ? Multiple sclerosis Mother    ? Diabetes Father    ? Heart disease Father 55        s/p bypass   ? Diabetes Paternal Aunt    ? Diabetes Paternal Uncle    ? Diabetes Paternal Grandmother    ? Colon cancer Paternal Grandfather 80   ? Breast cancer Neg Hx    ? Cancer " Neg Hx    ? Prostate cancer Neg Hx      Social History     Socioeconomic History   ? Marital status: Single     Spouse name: Not on file   ? Number of children: Not on file   ? Years of education: Not on file   ? Highest education level: Not on file   Occupational History   ? Not on file   Social Needs   ? Financial resource strain: Not on file   ? Food insecurity:     Worry: Not on file     Inability: Not on file   ? Transportation needs:     Medical: Not on file     Non-medical: Not on file   Tobacco Use   ? Smoking status: Former Smoker   ? Smokeless tobacco: Never Used   Substance and Sexual Activity   ? Alcohol use: Yes     Alcohol/week: 0.0 - 0.6 oz   ? Drug use: No   ? Sexual activity: Yes     Partners: Male   Lifestyle   ? Physical activity:     Days per week: Not on file     Minutes per session: Not on file   ? Stress: Not on file   Relationships   ? Social connections:     Talks on phone: Not on file     Gets together: Not on file     Attends [employer] service: Not on file     Active member of club or organization: Not on file     Attends meetings of clubs or organizations: Not on file     Relationship status: Not on file   ? Intimate partner violence:     Fear of current or ex partner: Not on file     Emotionally abused: Not on file     Physically abused: Not on file     Forced sexual activity: Not on file   Other Topics Concern   ? Not on file   Social History Narrative   ? Not on file     Relevant history was reviewed with the patient today, unless noted in HPI, nothing is pertinent for this visit.  [facility]   There are no active problems to display for this patient.    Past Surgical History:   Procedure Laterality Date   ? UT REMOVAL OF OVARY(S)      Description: Oophorectomy Unilateral Right Side;  Recorded: 06/24/2014;       RESULTS/CONSULTS (Lab/Rad)   No results found for this or any previous visit (from the past 168 hour(s)).  No results found.  MEDICATIONS     Current Outpatient Medications on File  Prior to Visit   Medication Sig Dispense Refill   ? desogestrel-ethinyl estradiol (RECLIPSEN, 28,) 0.15-0.03 mg per tablet TAKE 1 TABLET BY MOUTH ONCE DAILY. BEGIN ON SUNDAY AS DIRECTED. TAKE HORMONALLY ACTIVE PILLS CONTINUOUSLY FOR 3 MONTHS AT A TIME 112 tablet 3   ? ibuprofen (ADVIL,MOTRIN) 200 MG tablet Take 200-400 mg by mouth.       No current facility-administered medications on file prior to visit.        HEALTH MAINTENANCE / SCREENING   PHQ-2 Total Score: 4 (2/25/2019  3:01 PM)  , PHQ-9 Total Score: 8 (2/25/2019  3:01 PM)  ,No data recorded  Immunization History   Administered Date(s) Administered   ? HPV 9 Valent 02/25/2019   ? Td,adult,historic,unspecified 04/01/2006   ? Tdap 10/27/2015     Health Maintenance   Topic   ? INFLUENZA VACCINE RULE BASED (Season Ended)   ? ADVANCE DIRECTIVES DISCUSSED WITH PATIENT    ? PAP SMEAR    ? TD 18+ HE    ? TDAP ADULT ONE TIME DOSE        Aditya Singletary MD  Family Medicine, [facility]     This note was dictated using a voice recognition software.  Any grammatical or context distortion are unintentional and inherent to the software.

## 2021-07-07 NOTE — TELEPHONE ENCOUNTER
Refill Approved    Rx renewed per Medication Renewal Policy. Medication was last renewed on 5/1/21, last OV 4/29/21.    Hannah Huang, [facility] Triage/Med Refill 6/27/2021     Requested Prescriptions   Pending Prescriptions Disp Refills     buPROPion (WELLBUTRIN XL) 150 MG 24 hr tablet [Pharmacy Med Name: BUPROPION HCL  MG TABLET] 30 tablet 1     Sig: TAKE 1 TALBET BY MOUTH ONCE A DAY       Tricyclics/Misc Antidepressant/Antianxiety Meds Refill Protocol Passed - 6/26/2021 12:34 PM        Passed - PCP or prescribing provider visit in last year     Last office visit with prescriber/PCP: 2/6/2020 Nikki High CNP OR same dept: Visit date not found OR same specialty: 2/6/2020 Nikki High CNP  Last physical: 1/2/2020 Last MTM visit: Visit date not found   Next visit within 3 mo: Visit date not found  Next physical within 3 mo: Visit date not found  Prescriber OR PCP: Nikki High CNP  Last diagnosis associated with med order: 1. Moderate episode of recurrent major depressive disorder (H)  - buPROPion (WELLBUTRIN XL) 150 MG 24 hr tablet [Pharmacy Med Name: BUPROPION HCL  MG TABLET]; TAKE 1 TALBET BY MOUTH ONCE A DAY  Dispense: 30 tablet; Refill: 1    2. Obesity without serious comorbidity, unspecified classification, unspecified obesity type  - buPROPion (WELLBUTRIN XL) 150 MG 24 hr tablet [Pharmacy Med Name: BUPROPION HCL  MG TABLET]; TAKE 1 TALBET BY MOUTH ONCE A DAY  Dispense: 30 tablet; Refill: 1    If protocol passes may refill for 12 months if within 3 months of last provider visit (or a total of 15 months).

## 2021-08-05 DIAGNOSIS — N94.6 DYSMENORRHEA: ICD-10-CM

## 2021-08-05 RX ORDER — DESOGESTREL AND ETHINYL ESTRADIOL 0.15-0.03
KIT ORAL
Qty: 84 TABLET | Refills: 3 | Status: SHIPPED | OUTPATIENT
Start: 2021-08-05 | End: 2022-09-22

## 2021-10-16 ENCOUNTER — HEALTH MAINTENANCE LETTER (OUTPATIENT)
Age: 41
End: 2021-10-16

## 2022-06-14 ASSESSMENT — ENCOUNTER SYMPTOMS
ARTHRALGIAS: 0
SORE THROAT: 0
HEADACHES: 0
HEMATURIA: 0
COUGH: 0
FEVER: 0
JOINT SWELLING: 0
CHILLS: 0
ABDOMINAL PAIN: 0
PALPITATIONS: 0
DIZZINESS: 0
BREAST MASS: 0
CONSTIPATION: 0
DYSURIA: 0
HEMATOCHEZIA: 0
FREQUENCY: 0
SHORTNESS OF BREATH: 0
NERVOUS/ANXIOUS: 0
HEARTBURN: 0
PARESTHESIAS: 0
EYE PAIN: 0
WEAKNESS: 0
NAUSEA: 0
MYALGIAS: 0
DIARRHEA: 0

## 2022-06-16 ENCOUNTER — OFFICE VISIT (OUTPATIENT)
Dept: FAMILY MEDICINE | Facility: CLINIC | Age: 42
End: 2022-06-16
Payer: COMMERCIAL

## 2022-06-16 VITALS
BODY MASS INDEX: 31.71 KG/M2 | DIASTOLIC BLOOD PRESSURE: 72 MMHG | WEIGHT: 209.2 LBS | HEART RATE: 96 BPM | HEIGHT: 68 IN | SYSTOLIC BLOOD PRESSURE: 128 MMHG | RESPIRATION RATE: 17 BRPM | OXYGEN SATURATION: 99 %

## 2022-06-16 DIAGNOSIS — Z00.00 ROUTINE GENERAL MEDICAL EXAMINATION AT A HEALTH CARE FACILITY: Primary | ICD-10-CM

## 2022-06-16 DIAGNOSIS — N94.6 DYSMENORRHEA: ICD-10-CM

## 2022-06-16 DIAGNOSIS — R63.5 WEIGHT GAIN: ICD-10-CM

## 2022-06-16 DIAGNOSIS — F32.0 MILD MAJOR DEPRESSION (H): ICD-10-CM

## 2022-06-16 DIAGNOSIS — Z12.31 ENCOUNTER FOR SCREENING MAMMOGRAM FOR BREAST CANCER: ICD-10-CM

## 2022-06-16 DIAGNOSIS — Z13.0 SCREENING FOR DEFICIENCY ANEMIA: ICD-10-CM

## 2022-06-16 DIAGNOSIS — Z13.220 SCREENING FOR CHOLESTEROL LEVEL: ICD-10-CM

## 2022-06-16 DIAGNOSIS — E66.9 OBESITY WITHOUT SERIOUS COMORBIDITY, UNSPECIFIED CLASSIFICATION, UNSPECIFIED OBESITY TYPE: ICD-10-CM

## 2022-06-16 LAB
ALBUMIN SERPL-MCNC: 3.7 G/DL (ref 3.5–5)
ALP SERPL-CCNC: 81 U/L (ref 45–120)
ALT SERPL W P-5'-P-CCNC: 10 U/L (ref 0–45)
ANION GAP SERPL CALCULATED.3IONS-SCNC: 13 MMOL/L (ref 5–18)
AST SERPL W P-5'-P-CCNC: 12 U/L (ref 0–40)
BILIRUB SERPL-MCNC: 0.3 MG/DL (ref 0–1)
BUN SERPL-MCNC: 14 MG/DL (ref 8–22)
CALCIUM SERPL-MCNC: 9.3 MG/DL (ref 8.5–10.5)
CHLORIDE BLD-SCNC: 106 MMOL/L (ref 98–107)
CHOLEST SERPL-MCNC: 212 MG/DL
CO2 SERPL-SCNC: 21 MMOL/L (ref 22–31)
CREAT SERPL-MCNC: 0.84 MG/DL (ref 0.6–1.1)
ERYTHROCYTE [DISTWIDTH] IN BLOOD BY AUTOMATED COUNT: 12.9 % (ref 10–15)
FASTING STATUS PATIENT QL REPORTED: YES
GFR SERPL CREATININE-BSD FRML MDRD: 88 ML/MIN/1.73M2
GLUCOSE BLD-MCNC: 108 MG/DL (ref 70–125)
HBA1C MFR BLD: 5.9 % (ref 0–5.6)
HCT VFR BLD AUTO: 39.3 % (ref 35–47)
HDLC SERPL-MCNC: 51 MG/DL
HGB BLD-MCNC: 12.9 G/DL (ref 11.7–15.7)
INSULIN SERPL-ACNC: 13.6 MU/L (ref 3–25)
LDLC SERPL CALC-MCNC: 113 MG/DL
MCH RBC QN AUTO: 26.9 PG (ref 26.5–33)
MCHC RBC AUTO-ENTMCNC: 32.8 G/DL (ref 31.5–36.5)
MCV RBC AUTO: 82 FL (ref 78–100)
PLATELET # BLD AUTO: 353 10E3/UL (ref 150–450)
POTASSIUM BLD-SCNC: 4.1 MMOL/L (ref 3.5–5)
PROT SERPL-MCNC: 7.4 G/DL (ref 6–8)
RBC # BLD AUTO: 4.8 10E6/UL (ref 3.8–5.2)
SODIUM SERPL-SCNC: 140 MMOL/L (ref 136–145)
TRIGL SERPL-MCNC: 242 MG/DL
TSH SERPL DL<=0.005 MIU/L-ACNC: 3.75 UIU/ML (ref 0.3–5)
WBC # BLD AUTO: 9.1 10E3/UL (ref 4–11)

## 2022-06-16 PROCEDURE — 99214 OFFICE O/P EST MOD 30 MIN: CPT | Mod: 25 | Performed by: NURSE PRACTITIONER

## 2022-06-16 PROCEDURE — 80053 COMPREHEN METABOLIC PANEL: CPT | Performed by: NURSE PRACTITIONER

## 2022-06-16 PROCEDURE — 85027 COMPLETE CBC AUTOMATED: CPT | Performed by: NURSE PRACTITIONER

## 2022-06-16 PROCEDURE — 36415 COLL VENOUS BLD VENIPUNCTURE: CPT | Performed by: NURSE PRACTITIONER

## 2022-06-16 PROCEDURE — 90651 9VHPV VACCINE 2/3 DOSE IM: CPT | Performed by: NURSE PRACTITIONER

## 2022-06-16 PROCEDURE — 90471 IMMUNIZATION ADMIN: CPT | Performed by: NURSE PRACTITIONER

## 2022-06-16 PROCEDURE — 99396 PREV VISIT EST AGE 40-64: CPT | Mod: 25 | Performed by: NURSE PRACTITIONER

## 2022-06-16 PROCEDURE — 83036 HEMOGLOBIN GLYCOSYLATED A1C: CPT | Performed by: NURSE PRACTITIONER

## 2022-06-16 PROCEDURE — 84443 ASSAY THYROID STIM HORMONE: CPT | Performed by: NURSE PRACTITIONER

## 2022-06-16 PROCEDURE — 80061 LIPID PANEL: CPT | Performed by: NURSE PRACTITIONER

## 2022-06-16 PROCEDURE — 83525 ASSAY OF INSULIN: CPT | Performed by: NURSE PRACTITIONER

## 2022-06-16 RX ORDER — BUPROPION HYDROCHLORIDE 300 MG/1
TABLET ORAL
Qty: 90 TABLET | Refills: 3 | Status: SHIPPED | OUTPATIENT
Start: 2022-06-16 | End: 2024-03-29

## 2022-06-16 ASSESSMENT — ENCOUNTER SYMPTOMS
MYALGIAS: 0
WEAKNESS: 0
PALPITATIONS: 0
CONSTIPATION: 0
NAUSEA: 0
DIARRHEA: 0
JOINT SWELLING: 0
BREAST MASS: 0
ARTHRALGIAS: 0
SHORTNESS OF BREATH: 0
HEADACHES: 0
HEARTBURN: 0
NERVOUS/ANXIOUS: 0
DIZZINESS: 0
SORE THROAT: 0
ABDOMINAL PAIN: 0
COUGH: 0
DYSURIA: 0
CHILLS: 0
PARESTHESIAS: 0
HEMATOCHEZIA: 0
FEVER: 0
EYE PAIN: 0
FREQUENCY: 0
HEMATURIA: 0

## 2022-06-16 ASSESSMENT — ASTHMA QUESTIONNAIRES: ACT_TOTALSCORE: 25

## 2022-06-16 ASSESSMENT — PAIN SCALES - GENERAL: PAINLEVEL: NO PAIN (0)

## 2022-06-16 NOTE — PROGRESS NOTES
SUBJECTIVE:   CC: Eri Forbes is an 41 year old woman who presents for preventive health visit.     Weight:  Has gained 35 pounds in the past year.  Feels primarily in the abdominal area.  Pattern that is typical in her family.  frustrated by weight gain. Started on bupropion 2020. Initially found this helpful for suppressing appetite and cravings.  Not quite as much recently. Still having some cravings but feels better able to manage it.    Working out twice a week with weights.  Worried about diabetes - strong FMH.      Contraception:  Currently on OCP.  Have discussed other options, she is still interested in switching.  States she would like to consider a hysterectomy.  Periods are very painful without OCP.  Has consulted years ago and at that time OB suspected endometriosis.     HM:  Due mammogram    Patient has been advised of split billing requirements and indicates understanding: Yes  Healthy Habits:     Getting at least 3 servings of Calcium per day:  Yes    Bi-annual eye exam:  Yes    Dental care twice a year:  Yes    Sleep apnea or symptoms of sleep apnea:  Daytime drowsiness    Diet:  Regular (no restrictions)    Frequency of exercise:  2-3 days/week    Duration of exercise:  30-45 minutes    Taking medications regularly:  Yes    Medication side effects:  None    PHQ-2 Total Score: 0    Additional concerns today:  No              Today's PHQ-2 Score:   PHQ-2 ( 1999 [employer]) 6/16/2022   Q1: Little interest or pleasure in doing things 0   Q2: Feeling down, depressed or hopeless 0   PHQ-2 Score 0   Q1: Little interest or pleasure in doing things -   Q2: Feeling down, depressed or hopeless -   PHQ-2 Score -       Abuse: Current or Past (Physical, Sexual or Emotional) - No  Do you feel safe in your environment? Yes     No, advance care planning information given to patient to review.  Advanced care planning was discussed at today's visit.    Social History     Tobacco Use     Smoking status: Former  Smoker     Smokeless tobacco: Never Used   Substance Use Topics     Alcohol use: Yes     Alcohol/week: 0.0 - 1.0 standard drinks         Alcohol Use 6/14/2022   Prescreen: >3 drinks/day or >7 drinks/week? No       Reviewed orders with patient.  Reviewed health maintenance and updated orders accordingly - Yes  Lab work is in process    Breast Cancer Screening:    FHS-7: No flowsheet data found.    Mammogram Screening - Offered annual screening and updated Health Maintenance for mutual plan based on risk factor consideration    Pertinent mammograms are reviewed under the imaging tab.    History of abnormal Pap smear: NO - age 30-65 PAP every 5 years with negative HPV co-testing recommended  PAP / HPV Latest Ref Rng & Units 2/25/2019 10/27/2015 6/24/2014   PAP Negative for squamous intraepithelial lesion or malignancy. Negative for squamous intraepithelial lesion or malignancy  Electronically signed by Prudence Wolf CT (ASCP) on 3/1/2019 at 12:59 PM   Negative for squamous intraepithelial lesion or malignancy  Electronically signed by Emi Cuello CT (ASCP) on 11/9/2015 at  4:02 PM   Negative for squamous intraepithelial lesion or malignancy  Electronically signed by Prudence Wolf CT (ASCP) on 7/8/2014 at  3:28 PM     HPV16 NEG Negative - -   HPV18 NEG Negative - -   HRHPV NEG Negative - -     Reviewed and updated as needed this visit by clinical staff   Tobacco  Allergies  Meds   Med Hx  Surg Hx  Fam Hx  Soc Hx          Reviewed and updated as needed this visit by Provider                       Review of Systems   Constitutional: Negative for chills and fever.   HENT: Negative for congestion, ear pain, hearing loss and sore throat.    Eyes: Negative for pain and visual disturbance.   Respiratory: Negative for cough and shortness of breath.    Cardiovascular: Negative for chest pain, palpitations and peripheral edema.   Gastrointestinal: Negative for abdominal pain, constipation, diarrhea,  heartburn, hematochezia and nausea.   Breasts:  Negative for tenderness, breast mass and discharge.   Genitourinary: Negative for dysuria, frequency, genital sores, hematuria, pelvic pain, urgency, vaginal bleeding and vaginal discharge.   Musculoskeletal: Negative for arthralgias, joint swelling and myalgias.   Skin: Negative for rash.   Neurological: Negative for dizziness, weakness, headaches and paresthesias.   Psychiatric/Behavioral: Negative for mood changes. The patient is not nervous/anxious.           OBJECTIVE:   /72 (BP Location: Left arm, Patient Position: Sitting, Cuff Size: Adult Large)   Pulse 96   Resp 17   Wt 94.9 kg (209 lb 3.2 oz)   SpO2 99%   BMI 31.81 kg/m    Physical Exam  GENERAL: healthy, alert and no distress  EYES: Eyes grossly normal to inspection, PERRL and conjunctivae and sclerae normal  HENT: ear canals and TM's normal, nose and mouth without ulcers or lesions  NECK: no adenopathy, no asymmetry, masses, or scars and thyroid normal to palpation  RESP: lungs clear to auscultation - no rales, rhonchi or wheezes  BREAST: normal without masses, tenderness or nipple discharge and no palpable axillary masses or adenopathy  CV: regular rate and rhythm, normal S1 S2, no S3 or S4, no murmur, click or rub, no peripheral edema and peripheral pulses strong  ABDOMEN: soft, nontender, no hepatosplenomegaly, no masses and bowel sounds normal  MS: no gross musculoskeletal defects noted, no edema  SKIN: no suspicious lesions or rashes  NEURO: Normal strength and tone, mentation intact and speech normal  PSYCH: mentation appears normal, affect normal/bright    Diagnostic Test Results:  Labs reviewed in Epic    ASSESSMENT/PLAN:   1. Routine general medical examination at a health care facility  Order for mammogram. Up to date on pap. HPV #3 today.  Plan for CRC screening at age 44    2. Obesity without serious comorbidity, unspecified classification, unspecified obesity type  Has experienced a  35 pound weight gain in the past year, struggling with weight especially around the abdominal area.  Strong FMH of obesity and diabetes. Will check labs today.  She is exercising regularly. We discussed increasing protein and healthy fat in her diet, intermittent fasting. Bupropion has been effective in the past so will increase to 300mg.  Depending on labs, may consider Metformin or GLP1.  Also discussed phentermine as a possibility.  Follow up with results.    - buPROPion (WELLBUTRIN XL) 300 MG 24 hr tablet; [BUPROPION (WELLBUTRIN XL) 150 MG 24 HR TABLET] TAKE 1 TALBET BY MOUTH ONCE A DAY  Dispense: 90 tablet; Refill: 3    3. Dysmenorrhea  Suspected endometriosis.  Currently controlled on OCP, though she is interested in discontinuing this after discussing risks.  Counseled on IUDs.  Referral to OBGyn to discuss all options.   - Ob/Gyn Referral; Future    4. Mild major depression (H)  Feels mood has been well controlled with combination of sertraline and bupropion.    - sertraline (ZOLOFT) 50 MG tablet; Take 1 tablet (50 mg) by mouth daily  Dispense: 90 tablet; Refill: 3  - buPROPion (WELLBUTRIN XL) 300 MG 24 hr tablet; [BUPROPION (WELLBUTRIN XL) 150 MG 24 HR TABLET] TAKE 1 TALBET BY MOUTH ONCE A DAY  Dispense: 90 tablet; Refill: 3    5. Encounter for screening mammogram for breast cancer  - *MA Screening Digital Bilateral; Future    6. Screening for cholesterol level  - Lipid Profile (Chol, Trig, HDL, LDL calc); Future    7. Weight gain  - Insulin level; Future  - Comprehensive metabolic panel (BMP + Alb, Alk Phos, ALT, AST, Total. Bili, TP); Future  - Hemoglobin A1c; Future  - TSH with free T4 reflex; Future    8. Screening for deficiency anemia  - CBC with platelets; Future    Patient has been advised of split billing requirements and indicates understanding: Yes    COUNSELING:  Reviewed preventive health counseling, as reflected in patient instructions    Estimated body mass index is 31.81 kg/m  as calculated  "from the following:    Height as of 6/13/20: 1.727 m (5' 8\").    Weight as of this encounter: 94.9 kg (209 lb 3.2 oz).    Weight management plan: Discussed healthy diet and exercise guidelines    She reports that she has quit smoking. She has never used smokeless tobacco.      Counseling Resources:  ATP IV Guidelines  Pooled Cohorts Equation Calculator  Breast Cancer Risk Calculator  BRCA-Related Cancer Risk Assessment: FHS-7 Tool  FRAX Risk Assessment  ICSI Preventive Guidelines  Dietary Guidelines for Americans, 2010  USDA's MyPlate  ASA Prophylaxis  Lung CA Screening    Nikki High, CNP  [facility]  "

## 2022-06-27 ENCOUNTER — ANCILLARY PROCEDURE (OUTPATIENT)
Dept: MAMMOGRAPHY | Facility: HOSPITAL | Age: 42
End: 2022-06-27
Attending: NURSE PRACTITIONER
Payer: COMMERCIAL

## 2022-06-27 DIAGNOSIS — Z12.31 ENCOUNTER FOR SCREENING MAMMOGRAM FOR BREAST CANCER: ICD-10-CM

## 2022-06-27 PROCEDURE — 77067 SCR MAMMO BI INCL CAD: CPT

## 2022-06-28 ENCOUNTER — ANCILLARY PROCEDURE (OUTPATIENT)
Dept: MAMMOGRAPHY | Facility: CLINIC | Age: 42
End: 2022-06-28
Attending: NURSE PRACTITIONER
Payer: COMMERCIAL

## 2022-06-28 DIAGNOSIS — N64.89 BREAST ASYMMETRY: ICD-10-CM

## 2022-06-28 PROCEDURE — 76642 ULTRASOUND BREAST LIMITED: CPT | Mod: LT

## 2022-06-29 ENCOUNTER — ANCILLARY PROCEDURE (OUTPATIENT)
Dept: MAMMOGRAPHY | Facility: CLINIC | Age: 42
End: 2022-06-29
Attending: NURSE PRACTITIONER
Payer: COMMERCIAL

## 2022-06-29 DIAGNOSIS — N64.89 BREAST ASYMMETRY: ICD-10-CM

## 2022-06-29 DIAGNOSIS — N63.20 LEFT BREAST MASS: ICD-10-CM

## 2022-06-29 PROCEDURE — 19083 BX BREAST 1ST LESION US IMAG: CPT | Mod: LT

## 2022-06-29 PROCEDURE — 88305 TISSUE EXAM BY PATHOLOGIST: CPT | Mod: TC | Performed by: NURSE PRACTITIONER

## 2022-06-29 PROCEDURE — 88305 TISSUE EXAM BY PATHOLOGIST: CPT | Mod: 26 | Performed by: PATHOLOGY

## 2022-06-29 PROCEDURE — 999N000065 MA POST PROCEDURE LEFT

## 2022-06-29 PROCEDURE — 250N000009 HC RX 250: Performed by: NURSE PRACTITIONER

## 2022-06-29 PROCEDURE — 272N000431 US BREAST BIOPSY CORE NEEDLE LEFT

## 2022-06-29 RX ADMIN — LIDOCAINE HYDROCHLORIDE 10 ML: 10 INJECTION, SOLUTION EPIDURAL; INFILTRATION; INTRACAUDAL; PERINEURAL at 13:13

## 2022-07-01 ENCOUNTER — TELEPHONE (OUTPATIENT)
Dept: MAMMOGRAPHY | Facility: CLINIC | Age: 42
End: 2022-07-01

## 2022-07-01 ENCOUNTER — MYC MEDICAL ADVICE (OUTPATIENT)
Dept: MAMMOGRAPHY | Facility: CLINIC | Age: 42
End: 2022-07-01

## 2022-07-01 LAB
PATH REPORT.COMMENTS IMP SPEC: NORMAL
PATH REPORT.FINAL DX SPEC: NORMAL
PATH REPORT.GROSS SPEC: NORMAL
PATH REPORT.MICROSCOPIC SPEC OTHER STN: NORMAL
PATH REPORT.RELEVANT HX SPEC: NORMAL
PHOTO IMAGE: NORMAL

## 2022-09-25 ENCOUNTER — HEALTH MAINTENANCE LETTER (OUTPATIENT)
Age: 42
End: 2022-09-25

## 2023-05-19 DIAGNOSIS — N94.6 DYSMENORRHEA: ICD-10-CM

## 2023-05-20 RX ORDER — DESOGESTREL AND ETHINYL ESTRADIOL 0.15-0.03
KIT ORAL
Qty: 84 TABLET | Refills: 0 | Status: SHIPPED | OUTPATIENT
Start: 2023-05-20 | End: 2023-07-18

## 2023-05-20 NOTE — TELEPHONE ENCOUNTER
"Last Written Prescription Date:  9/23/2022  Last Fill Quantity: 84,  # refills: 2   Last office visit provider:  6/16/2022     Requested Prescriptions   Pending Prescriptions Disp Refills     RECLIPSEN 0.15-30 MG-MCG tablet [Pharmacy Med Name: RECLIPSEN 28 DAY TABLET] 84 tablet 2     Sig: TAKE 1 ACTIVE TABLET BY MOUTH DAILY CONTINUOUSLY. BEGIN ON SUNDAY AS DIRECTED       Contraceptives Protocol Passed - 5/19/2023 10:07 AM        Passed - Patient is not a current smoker if age is 35 or older        Passed - Recent (12 mo) or future (30 days) visit within the authorizing provider's specialty     Patient has had an office visit with the authorizing provider or a provider within the authorizing providers department within the previous 12 mos or has a future within next 30 days. See \"Patient Info\" tab in inbasket, or \"Choose Columns\" in Meds & Orders section of the refill encounter.              Passed - Medication is active on med list        Passed - No active pregnancy on record        Passed - No positive pregnancy test in past 12 months             Mary Rowan RN 05/20/23 4:42 AM  "

## 2023-07-14 DIAGNOSIS — N94.6 DYSMENORRHEA: ICD-10-CM

## 2023-07-15 NOTE — TELEPHONE ENCOUNTER
"Routing refill request to provider for review/approval because:  Patient needs to be seen because it has been more than 1 year since last office visit.    Last Written Prescription Date:  5/20/23  Last Fill Quantity: 84,  # refills: 0   Last office visit provider:  6/16/22    Requested Prescriptions   Pending Prescriptions Disp Refills    RECLIPSEN 0.15-30 MG-MCG tablet [Pharmacy Med Name: RECLIPSEN 28 DAY TABLET] 84 tablet 0     Sig: TAKE 1 ACTIVE TABLET BY MOUTH DAILY CONTINUOUSLY. BEGIN ON SUNDAY AS DIRECTED       Contraceptives Protocol Failed - 7/14/2023  8:05 PM        Failed - Recent (12 mo) or future (30 days) visit within the authorizing provider's specialty     Patient has had an office visit with the authorizing provider or a provider within the authorizing providers department within the previous 12 mos or has a future within next 30 days. See \"Patient Info\" tab in inbasket, or \"Choose Columns\" in Meds & Orders section of the refill encounter.              Passed - Patient is not a current smoker if age is 35 or older        Passed - Medication is active on med list        Passed - No active pregnancy on record        Passed - No positive pregnancy test in past 12 months             Julia Holly RN 07/14/23 8:35 PM  "

## 2023-07-18 RX ORDER — DESOGESTREL AND ETHINYL ESTRADIOL 0.15-0.03
KIT ORAL
Qty: 84 TABLET | Refills: 4 | Status: SHIPPED | OUTPATIENT
Start: 2023-07-18 | End: 2024-04-09

## 2023-07-24 ENCOUNTER — ANCILLARY PROCEDURE (OUTPATIENT)
Dept: MAMMOGRAPHY | Facility: HOSPITAL | Age: 43
End: 2023-07-24
Attending: NURSE PRACTITIONER
Payer: COMMERCIAL

## 2023-07-24 DIAGNOSIS — Z12.31 VISIT FOR SCREENING MAMMOGRAM: ICD-10-CM

## 2023-07-24 PROCEDURE — 77067 SCR MAMMO BI INCL CAD: CPT

## 2023-08-06 ENCOUNTER — HEALTH MAINTENANCE LETTER (OUTPATIENT)
Age: 43
End: 2023-08-06

## 2024-03-20 ENCOUNTER — OFFICE VISIT (OUTPATIENT)
Dept: FAMILY MEDICINE | Facility: CLINIC | Age: 44
End: 2024-03-20
Payer: COMMERCIAL

## 2024-03-20 VITALS
WEIGHT: 208 LBS | DIASTOLIC BLOOD PRESSURE: 78 MMHG | TEMPERATURE: 98.1 F | RESPIRATION RATE: 14 BRPM | HEART RATE: 99 BPM | OXYGEN SATURATION: 98 % | SYSTOLIC BLOOD PRESSURE: 126 MMHG | BODY MASS INDEX: 31.52 KG/M2 | HEIGHT: 68 IN

## 2024-03-20 DIAGNOSIS — R73.03 PREDIABETES: ICD-10-CM

## 2024-03-20 DIAGNOSIS — E66.9 OBESITY WITHOUT SERIOUS COMORBIDITY, UNSPECIFIED CLASSIFICATION, UNSPECIFIED OBESITY TYPE: ICD-10-CM

## 2024-03-20 DIAGNOSIS — Z12.4 CERVICAL CANCER SCREENING: ICD-10-CM

## 2024-03-20 DIAGNOSIS — Z00.00 ROUTINE GENERAL MEDICAL EXAMINATION AT A HEALTH CARE FACILITY: Primary | ICD-10-CM

## 2024-03-20 DIAGNOSIS — F33.1 MODERATE EPISODE OF RECURRENT MAJOR DEPRESSIVE DISORDER (H): ICD-10-CM

## 2024-03-20 DIAGNOSIS — Z76.89 ENCOUNTER FOR WEIGHT MANAGEMENT: ICD-10-CM

## 2024-03-20 DIAGNOSIS — F41.1 GAD (GENERALIZED ANXIETY DISORDER): ICD-10-CM

## 2024-03-20 LAB
ALBUMIN SERPL BCG-MCNC: 4.6 G/DL (ref 3.5–5.2)
ALP SERPL-CCNC: 92 U/L (ref 40–150)
ALT SERPL W P-5'-P-CCNC: 36 U/L (ref 0–50)
ANION GAP SERPL CALCULATED.3IONS-SCNC: 15 MMOL/L (ref 7–15)
AST SERPL W P-5'-P-CCNC: 30 U/L (ref 0–45)
BILIRUB SERPL-MCNC: 0.3 MG/DL
BUN SERPL-MCNC: 16.1 MG/DL (ref 6–20)
CALCIUM SERPL-MCNC: 9.8 MG/DL (ref 8.6–10)
CHLORIDE SERPL-SCNC: 104 MMOL/L (ref 98–107)
CHOLEST SERPL-MCNC: 247 MG/DL
CREAT SERPL-MCNC: 0.86 MG/DL (ref 0.51–0.95)
DEPRECATED HCO3 PLAS-SCNC: 19 MMOL/L (ref 22–29)
EGFRCR SERPLBLD CKD-EPI 2021: 85 ML/MIN/1.73M2
ERYTHROCYTE [DISTWIDTH] IN BLOOD BY AUTOMATED COUNT: 13 % (ref 10–15)
FASTING STATUS PATIENT QL REPORTED: YES
GLUCOSE SERPL-MCNC: 114 MG/DL (ref 70–99)
HBA1C MFR BLD: 5.9 % (ref 0–5.6)
HCT VFR BLD AUTO: 39.7 % (ref 35–47)
HDLC SERPL-MCNC: 53 MG/DL
HGB BLD-MCNC: 13.2 G/DL (ref 11.7–15.7)
LDLC SERPL CALC-MCNC: 146 MG/DL
MCH RBC QN AUTO: 27.5 PG (ref 26.5–33)
MCHC RBC AUTO-ENTMCNC: 33.2 G/DL (ref 31.5–36.5)
MCV RBC AUTO: 83 FL (ref 78–100)
NONHDLC SERPL-MCNC: 194 MG/DL
PLATELET # BLD AUTO: 344 10E3/UL (ref 150–450)
POTASSIUM SERPL-SCNC: 4.4 MMOL/L (ref 3.4–5.3)
PROT SERPL-MCNC: 7.8 G/DL (ref 6.4–8.3)
RBC # BLD AUTO: 4.8 10E6/UL (ref 3.8–5.2)
SODIUM SERPL-SCNC: 138 MMOL/L (ref 135–145)
TRIGL SERPL-MCNC: 241 MG/DL
TSH SERPL DL<=0.005 MIU/L-ACNC: 3.79 UIU/ML (ref 0.3–4.2)
WBC # BLD AUTO: 10.7 10E3/UL (ref 4–11)

## 2024-03-20 PROCEDURE — 96127 BRIEF EMOTIONAL/BEHAV ASSMT: CPT

## 2024-03-20 PROCEDURE — 80061 LIPID PANEL: CPT

## 2024-03-20 PROCEDURE — 84443 ASSAY THYROID STIM HORMONE: CPT

## 2024-03-20 PROCEDURE — 85027 COMPLETE CBC AUTOMATED: CPT

## 2024-03-20 PROCEDURE — 99396 PREV VISIT EST AGE 40-64: CPT

## 2024-03-20 PROCEDURE — 99214 OFFICE O/P EST MOD 30 MIN: CPT | Mod: 25

## 2024-03-20 PROCEDURE — 36415 COLL VENOUS BLD VENIPUNCTURE: CPT

## 2024-03-20 PROCEDURE — 87624 HPV HI-RISK TYP POOLED RSLT: CPT

## 2024-03-20 PROCEDURE — 83036 HEMOGLOBIN GLYCOSYLATED A1C: CPT

## 2024-03-20 PROCEDURE — G0145 SCR C/V CYTO,THINLAYER,RESCR: HCPCS

## 2024-03-20 PROCEDURE — 80053 COMPREHEN METABOLIC PANEL: CPT

## 2024-03-20 ASSESSMENT — PATIENT HEALTH QUESTIONNAIRE - PHQ9
SUM OF ALL RESPONSES TO PHQ QUESTIONS 1-9: 5
SUM OF ALL RESPONSES TO PHQ QUESTIONS 1-9: 5
10. IF YOU CHECKED OFF ANY PROBLEMS, HOW DIFFICULT HAVE THESE PROBLEMS MADE IT FOR YOU TO DO YOUR WORK, TAKE CARE OF THINGS AT HOME, OR GET ALONG WITH OTHER PEOPLE: NOT DIFFICULT AT ALL

## 2024-03-20 NOTE — PROGRESS NOTES
"Preventive Care Visit  [facility]  ROMULO Enrique CNP, Nurse Practitioner Primary Care  Mar 20, 2024      Assessment & Plan     Routine general medical examination at a health care facility  Preventative exam completed today. Discussed healthy lifestyle recommendations of getting 150 minutes of exercise weekly and eating a healthy diet. Reviewed and updated health maintenance. Pap/Labs completed today.   - REVIEW OF HEALTH MAINTENANCE PROTOCOL ORDERS  - CBC with platelets  - Comprehensive metabolic panel (BMP + Alb, Alk Phos, ALT, AST, Total. Bili, TP)  - Lipid panel reflex to direct LDL Fasting    Prediabetes  Last A1c 5.9.   - Hemoglobin A1c    Obesity without serious comorbidity, unspecified classification, unspecified obesity type  - TSH with free T4 reflex  - Lipid panel reflex to direct LDL Fasting  - Med Therapy Management Referral  Encounter for weight management  - Med Therapy Management Referral    Having difficulty losing weight and continuing to gain. Eats healthy and exercises routinely. Is interested in possible medication management. Will check labs and referral placed for MTM. No family history of thyroid cancer.     Cervical cancer screening  - Pap Screen with HPV - recommended age 30 - 65 years    CHARANJIT (generalized anxiety disorder)  Moderate episode of recurrent major depressive disorder (H)  Stable. Continues on sertraline and buproprion.       BMI  Estimated body mass index is 31.63 kg/m  as calculated from the following:    Height as of this encounter: 1.727 m (5' 8\").    Weight as of this encounter: 94.3 kg (208 lb).   Weight management plan: Patient referred to endocrine and/or weight management specialty      Abdirashid   Eri is a 43 year old, presenting for the following:  Physical (Fasting today) and Weight Problem (Pt reports that she unable to lose weight within the last 3 years )        3/20/2024     9:44 AM   Additional Questions   Roomed by SOUMYA Miramontes "   Accompanied by alone        Health Care Directive  Patient does not have a Health Care Directive or Living Will: Discussed advance care planning with patient; information given to patient to review.    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    Eats well and exercises routinely and gained weight approximately 30 lbs and unable to lose the weight. Paid for a professional  and saw them twice a week for a year with no improvement. She was stronger but did not lose weight or inches. Has previously tried medifast. Feeling frustrated. Father recently passed, she reports it was either a heart attack or stroke. She would like to prioritize her health as he did not.     History of anxiety/depression.            No data to display                  6/14/2022   Nutrition   Three or more servings of calcium each day? Yes   Diet: regular (no restrictions)         6/14/2022   Exercise   Frequency of exercise: 2-3 days/week            6/14/2022   Dental   Dentist two times every year? Yes          Today's PHQ-9 Score:       3/20/2024     9:35 AM   PHQ-9 SCORE   PHQ-9 Total Score MyChart 5 (Mild depression)   PHQ-9 Total Score 5         6/14/2022   Substance Use   Alcohol more than 3/day or more than 7/wk No     Social History     Tobacco Use    Smoking status: Former    Smokeless tobacco: Never   Substance Use Topics    Alcohol use: Yes     Alcohol/week: 0.0 - 1.0 standard drinks of alcohol    Drug use: No           3/18/2024   Breast Cancer Screening   Family history of breast, colon, or ovarian cancer? Yes         3/18/2024   LAST FHS-7 RESULTS   1st degree relative breast or ovarian cancer No   Any relative bilateral breast cancer No   Any male have breast cancer No   Any ONE woman have BOTH breast AND ovarian cancer No   Any woman with breast cancer before 50yrs No   2 or more relatives with breast AND/OR ovarian cancer No   2 or more relatives with breast  AND/OR bowel cancer Yes       Mammogram Screening - Mammogram every 1-2 years updated in Health Maintenance based on mutual decision making      History of abnormal Pap smear: NO - age 30-65 PAP every 5 years with negative HPV co-testing recommended        Latest Ref Rng & Units 2/25/2019     4:35 PM 10/27/2015     8:36 AM 6/24/2014     8:48 AM   PAP / HPV   PAP Negative for squamous intraepithelial lesion or malignancy. Negative for squamous intraepithelial lesion or malignancy  Electronically signed by Prudence Wolf CT (ASCP) on 3/1/2019 at 12:59 PM    Negative for squamous intraepithelial lesion or malignancy  Electronically signed by Emi Cuello CT (ASCP) on 11/9/2015 at  4:02 PM    Negative for squamous intraepithelial lesion or malignancy  Electronically signed by Prudence Wolf CT (ASCP) on 7/8/2014 at  3:28 PM      HPV 16 DNA NEG Negative      HPV 18 DNA NEG Negative      Other HR HPV NEG Negative        ASCVD Risk   The 10-year ASCVD risk score (Juan SYED, et al., 2019) is: 0.9%    Values used to calculate the score:      Age: 43 years      Sex: Female      Is Non- : No      Diabetic: No      Tobacco smoker: No      Systolic Blood Pressure: 126 mmHg      Is BP treated: No      HDL Cholesterol: 51 mg/dL      Total Cholesterol: 212 mg/dL         No data to display              Reviewed and updated as needed this visit by Provider                    Lab work is in process  Labs reviewed in EPIC  BP Readings from Last 3 Encounters:   03/20/24 126/78   06/16/22 128/72   02/06/20 114/76    Wt Readings from Last 3 Encounters:   03/20/24 94.3 kg (208 lb)   06/16/22 94.9 kg (209 lb 3.2 oz)   02/06/20 83.5 kg (184 lb)           Review of Systems  CONSTITUTIONAL: NEGATIVE for fever, chills, change in weight  INTEGUMENTARY/SKIN: NEGATIVE for worrisome rashes, moles or lesions  EYES: NEGATIVE for vision changes or irritation  ENT/MOUTH: NEGATIVE for ear, mouth and  "throat problems  RESP: NEGATIVE for significant cough or SOB  BREAST: NEGATIVE for masses, tenderness or discharge  CV: NEGATIVE for chest pain, palpitations or peripheral edema  GI: NEGATIVE for nausea, abdominal pain, heartburn, or change in bowel habits  : NEGATIVE for frequency, dysuria, or hematuria  MUSCULOSKELETAL: NEGATIVE for significant arthralgias or myalgia  NEURO: NEGATIVE for weakness, dizziness or paresthesias  ENDOCRINE: NEGATIVE for temperature intolerance, skin/hair changes  HEME: NEGATIVE for bleeding problems  PSYCHIATRIC: NEGATIVE for changes in mood or affect     Objective    Exam  /78 (BP Location: Left arm, Patient Position: Sitting, Cuff Size: Adult Regular)   Pulse 99   Temp 98.1  F (36.7  C) (Tympanic)   Resp 14   Ht 1.727 m (5' 8\")   Wt 94.3 kg (208 lb)   LMP  (LMP Unknown)   SpO2 98%   BMI 31.63 kg/m     Estimated body mass index is 31.63 kg/m  as calculated from the following:    Height as of this encounter: 1.727 m (5' 8\").    Weight as of this encounter: 94.3 kg (208 lb).    Physical Exam  GENERAL: alert and no distress  EYES: Eyes grossly normal to inspection, PERRL and conjunctivae and sclerae normal  HENT: ear canals and TM's normal, nose and mouth without ulcers or lesions  NECK: no adenopathy, no asymmetry, masses, or scars  RESP: lungs clear to auscultation - no rales, rhonchi or wheezes  CV: regular rate and rhythm, normal S1 S2, no S3 or S4, no murmur, click or rub, no peripheral edema  ABDOMEN: soft, nontender, no hepatosplenomegaly, no masses and bowel sounds normal   (female): normal female external genitalia, normal urethral meatus, normal vaginal mucosa  MS: no gross musculoskeletal defects noted, no edema  SKIN: no suspicious lesions or rashes  NEURO: Normal strength and tone, mentation intact and speech normal  PSYCH: mentation appears normal, affect normal/bright    Signed Electronically by: ROMULO Enrique CNP    "

## 2024-03-22 LAB
BKR LAB AP GYN ADEQUACY: NORMAL
BKR LAB AP GYN INTERPRETATION: NORMAL
BKR LAB AP HPV REFLEX: NORMAL
BKR LAB AP PREVIOUS ABNORMAL: NORMAL
PATH REPORT.COMMENTS IMP SPEC: NORMAL
PATH REPORT.COMMENTS IMP SPEC: NORMAL
PATH REPORT.RELEVANT HX SPEC: NORMAL

## 2024-03-25 LAB
HUMAN PAPILLOMA VIRUS 16 DNA: NEGATIVE
HUMAN PAPILLOMA VIRUS 18 DNA: NEGATIVE
HUMAN PAPILLOMA VIRUS FINAL DIAGNOSIS: NORMAL
HUMAN PAPILLOMA VIRUS OTHER HR: NEGATIVE

## 2024-03-29 ENCOUNTER — OFFICE VISIT (OUTPATIENT)
Dept: PHARMACY | Facility: CLINIC | Age: 44
End: 2024-03-29
Payer: COMMERCIAL

## 2024-03-29 DIAGNOSIS — F41.1 GAD (GENERALIZED ANXIETY DISORDER): ICD-10-CM

## 2024-03-29 DIAGNOSIS — Z78.9 TAKES DIETARY SUPPLEMENTS: ICD-10-CM

## 2024-03-29 DIAGNOSIS — E66.811 CLASS 1 OBESITY WITH BODY MASS INDEX (BMI) OF 31.0 TO 31.9 IN ADULT, UNSPECIFIED OBESITY TYPE, UNSPECIFIED WHETHER SERIOUS COMORBIDITY PRESENT: Primary | ICD-10-CM

## 2024-03-29 DIAGNOSIS — F33.1 MODERATE EPISODE OF RECURRENT MAJOR DEPRESSIVE DISORDER (H): ICD-10-CM

## 2024-03-29 PROCEDURE — 99605 MTMS BY PHARM NP 15 MIN: CPT

## 2024-03-29 PROCEDURE — 99607 MTMS BY PHARM ADDL 15 MIN: CPT

## 2024-03-29 RX ORDER — BUPROPION HYDROCHLORIDE 150 MG/1
150 TABLET ORAL EVERY MORNING
COMMUNITY
End: 2024-04-09

## 2024-03-29 NOTE — PROGRESS NOTES
Medication Therapy Management (MTM) Encounter    ASSESSMENT:                            Medication Adherence/Access: No issues identified    Obesity: Currently on bupropion, but does not help much with weight loss. Patient would benefit from GLP-1 injectables;however, they are not covered by insurance. Advised patient to limit snacking, sleep on empty stomach and try intermittent fasting. While doing PA for mounjaro for prediabetes we will start patient on Qsymia. Contrave is less efficient than Qsymia, and patient is taking bupropion as well. Topiramate in qsymia interacts with patient's contraceptive, but patient noted she is using for lowering uterine bleeding rather than contraception.    Depression and Anxiety:Stable on current medications      Dietary Supplements:Continue taking dietary supplements    Multivitamin one tablet daily    PLAN:                             3.75 mg phentermine/23 mg topiramate once daily for 14 days, then increase to 7.5 mg phentermine/46 mg topiramate once daily; assess weight after 12 weeks of therapy and adjust dose as appropriate.  PharmD to PA for mounjaro for prediabetes  Consider trying intermittent fasting,and also limiting/void snacking and sleep on empty stomach     Follow-up: Due when needed by patient or provider     SUBJECTIVE/OBJECTIVE:                          Eri Forbes is a 43 year old female seen for an initial visit. She was referred to me from Dr. Huber.      Reason for visit: Weight management.    Allergies/ADRs: Reviewed in chart  Past Medical History: Reviewed in chart  Tobacco: She reports that she has quit smoking. She has never used smokeless tobacco.  Alcohol: never used  Caffeine: 24 oz a day   Medication Adherence/Access: no issues reported    Obesity    Bupropion 150 mg daily. Not helpful. She was taking   Class I Obesity (BMI 30 to 34.9):   Bupropion 300 mg once daily  Patient reports no current medication side effects.  Nutrition/Eating Habits:   "She does not snack very often; she drinks a lot coffee in the morning and then eats at 11 AM. She eats a lot of proteins. She has lunch at 1 PM and it is salad. Then she eats snack 4 PM. Then later eats dinner 6 -7 PM. She sleeps at around 11 PM.   Exercise/Activity: Used to do hiit classes. She was taking yoga classes.   Medication History:  Bupropion: 300 mg     Wt Readings from Last 4 Encounters:   03/20/24 208 lb (94.3 kg)   06/16/22 209 lb 3.2 oz (94.9 kg)   02/06/20 184 lb (83.5 kg)   01/02/20 186 lb 4 oz (84.5 kg)     Estimated body mass index is 31.63 kg/m  as calculated from the following:    Height as of 3/20/24: 5' 8\" (1.727 m).    Weight as of 3/20/24: 208 lb (94.3 kg).    Depression and Anxiety:  Anxiety is well managed    Sertraline 50 mg daily    Bupropion  mg daily     Dietary Supplements:   Multivitamin one tablet daily    BP Readings from Last 3 Encounters:   03/20/24 126/78   06/16/22 128/72   02/06/20 114/76       Today's Vitals: LMP  (LMP Unknown)   ----------------      I spent 60 minutes with this patient today. All changes were made via collaborative practice agreement with Physician No Ref-Primary. A copy of the visit note was provided to the patient's provider(s).    A summary of these recommendations was given to the patient.     Medication Therapy Recommendations  Class 1 obesity with body mass index (BMI) of 31.0 to 31.9 in adult, unspecified obesity type, unspecified whether serious comorbidity present    Rationale: Untreated condition - Needs additional medication therapy - Indication   Recommendation: Start Medication - Qsymia 3.75-23 MG Cp24   Status: Accepted per MALLORY Rodriguez, PharmD     Medication Therapy Management (MTM) Pharmacist     [phone]     [email or web address]    "

## 2024-03-29 NOTE — PATIENT INSTRUCTIONS
"Recommendations from today's MTM visit:                                                      MTM (medication therapy management) is a service provided by a clinical pharmacist designed to help you get the most of out of your medicines.   Today we reviewed what your medicines are for, how to know if they are working, that your medicines are safe and how to make your medicine regimen as easy as possible.       3.75 mg phentermine/23 mg topiramate once daily for 14 days, then increase to 7.5 mg phentermine/46 mg topiramate once daily; assess weight after 12 weeks of therapy and adjust dose as appropriate.  PharmD to PA for mounjaro for prediabetes  Consider trying intermittent fasting,and also limiting/void snacking and sleep on empty stomach     Follow-up: Due when needed by patient or provider     It was great speaking with you today.  I value your experience and would be very thankful for your time in providing feedback in our clinic survey. In the next few days, you may receive an email or text message from [email or web address] with a link to a survey related to your  clinical pharmacist.\"     To schedule another MTM appointment, please call the clinic directly or you may call the MTM scheduling line at [phone].    My Clinical Pharmacist's contact information:                                                      Please feel free to contact me with any questions or concerns you have.        Nely Rodriguez, PharmD     Medication Therapy Management (MTM) Pharmacist     [phone]     [email or web address]    "

## 2024-03-31 DIAGNOSIS — E66.9 OBESITY WITHOUT SERIOUS COMORBIDITY, UNSPECIFIED CLASSIFICATION, UNSPECIFIED OBESITY TYPE: Primary | ICD-10-CM

## 2024-04-01 ENCOUNTER — TELEPHONE (OUTPATIENT)
Dept: FAMILY MEDICINE | Facility: CLINIC | Age: 44
End: 2024-04-01
Payer: COMMERCIAL

## 2024-04-01 DIAGNOSIS — R73.03 PREDIABETES: Primary | ICD-10-CM

## 2024-04-01 RX ORDER — PHENTERMINE AND TOPIRAMATE 7.5; 46 MG/1; MG/1
1 CAPSULE, EXTENDED RELEASE ORAL DAILY
Qty: 90 CAPSULE | Refills: 1 | Status: SHIPPED | OUTPATIENT
Start: 2024-04-01

## 2024-04-01 RX ORDER — TIRZEPATIDE 2.5 MG/.5ML
2.5 INJECTION, SOLUTION SUBCUTANEOUS
Qty: 2 ML | Refills: 0 | Status: SHIPPED | OUTPATIENT
Start: 2024-04-01

## 2024-04-01 NOTE — TELEPHONE ENCOUNTER
Prior Authorization Retail Medication Request    Medication/Dose: Mounjaro 2.5 mg daily   Diagnosis and ICD code (if different than what is on RX):    New/renewal/insurance change PA/secondary ins. PA:  Previously Tried and Failed:  N/A  Rationale:  Patient's sugar and A1C has been increasing and GLP- injection could help with sugar control, and with weight loss.        Insurance Company: [employer] out of [place]  BIN:   PCN:   Grp: [id number]  ID: [id number]

## 2024-04-01 NOTE — TELEPHONE ENCOUNTER
Considering GLP-1 injectables are not covered appropriate to try oral medications. Qsymia is more effective than contrave.        Margaux, Pended is qsymia orders.    Thanks,    Nely Rodriguez, PharmD     Medication Therapy Management (MTM) Pharmacist     [phone]     [email or web address]

## 2024-04-01 NOTE — TELEPHONE ENCOUNTER
Justus Martinez,    I am not sure if the insurance cover any weight loss medication. I am trying to order phentermine and it seems not to be covered. Lets weight for the PA outcome.     Thanks,  Nely

## 2024-04-01 NOTE — TELEPHONE ENCOUNTER
[facility] Prior Authorization Team Request    Medication:    Phentermine-Topiramate (QSYMIA) 7.5-46 MG CP24 90 capsule 1 4/1/2024 -- No   Sig - Route: Take 1 capsule by mouth daily - Oral   Sent to pharmacy as: Qsymia 7.5-46 MG Oral Capsule Extended Release 24 Hour (Phentermine-Topiramate)   Class: E-Prescribe   Order: [id number]       NDC (required for Medicaid members):   Insurance Company: [employer] out of State  BIN:   PCN:   Grp: [id number]  ID: [id number]    CoverMyMeds Key (if applicable):   Additional documentation:   Pharmacy Filling the Rx:    [id number] - [place], [place] - [address] [phone]       Contact: P [facility]ARM [facility] PA (P [id number]) please send all responses to this pool.  Pharmacy NPI (required for Medicaid members):

## 2024-04-08 ENCOUNTER — MYC MEDICAL ADVICE (OUTPATIENT)
Dept: FAMILY MEDICINE | Facility: CLINIC | Age: 44
End: 2024-04-08
Payer: COMMERCIAL

## 2024-04-08 DIAGNOSIS — E66.9 OBESITY WITHOUT SERIOUS COMORBIDITY, UNSPECIFIED CLASSIFICATION, UNSPECIFIED OBESITY TYPE: ICD-10-CM

## 2024-04-08 DIAGNOSIS — R73.03 PREDIABETES: Primary | ICD-10-CM

## 2024-04-08 NOTE — TELEPHONE ENCOUNTER
Retail Pharmacy Prior Authorization Team   Phone: [phone]    PRIOR AUTHORIZATION DENIED    Medication: MOUNJARO 2.5 MG/0.5ML SC SOPN  Insurance Company: [employer] [place] - Phone [phone] Fax [phone]  Denial Date: 4/7/2024  Denial Reason(s): MUST HAVE TYPE 2 DIABETES AND DOCUMENTATION MUST BE PROVIDED CONFIRMING DX      Appeal Information: IF THE PROVIDER WOULD LIKE TO APPEAL THIS DECISION PLEASE PROVIDE THE PA TEAM WITH A LETTER OF MEDICAL NECESSITY      Patient Notified: No

## 2024-04-09 DIAGNOSIS — F32.0 MILD MAJOR DEPRESSION (H): ICD-10-CM

## 2024-04-09 DIAGNOSIS — N94.6 DYSMENORRHEA: ICD-10-CM

## 2024-04-09 RX ORDER — BUPROPION HYDROCHLORIDE 150 MG/1
150 TABLET ORAL EVERY MORNING
Qty: 90 TABLET | Refills: 3 | Status: SHIPPED | OUTPATIENT
Start: 2024-04-09

## 2024-04-09 RX ORDER — DESOGESTREL AND ETHINYL ESTRADIOL 0.15-0.03
KIT ORAL
Qty: 84 TABLET | Refills: 4 | Status: SHIPPED | OUTPATIENT
Start: 2024-04-09

## 2024-04-09 RX ORDER — METFORMIN HCL 500 MG
500 TABLET, EXTENDED RELEASE 24 HR ORAL
Qty: 90 TABLET | Refills: 1 | Status: SHIPPED | OUTPATIENT
Start: 2024-04-09 | End: 2024-10-03

## 2024-04-11 DIAGNOSIS — N94.6 DYSMENORRHEA: ICD-10-CM

## 2024-04-11 RX ORDER — DESOGESTREL AND ETHINYL ESTRADIOL 0.15-0.03
KIT ORAL
OUTPATIENT
Start: 2024-04-11

## 2024-04-11 NOTE — TELEPHONE ENCOUNTER
PRIOR AUTHORIZATION DENIED    Medication: QSYMIA 7.5-46 MG PO CP24  Insurance Company: Comment:  [facility]  Denial Date: 4/11/2024  Denial Reason(s): NOT A COVERED DRUG  Appeal Information: N/A  Patient Notified: NO    PER PHONE CALL TODAY WITH INSURANCE - SPOKE WITH DIANE RUBIO 1:34 PST  - NO PA AVAILABLE FOR THIS MEDICATION AS IT IS NOT A COVERED DRUG. NO GENERIC ALTERNATIVES ARE AVAILABLE.

## 2024-04-12 RX ORDER — DESOGESTREL AND ETHINYL ESTRADIOL 0.15-0.03
KIT ORAL
OUTPATIENT
Start: 2024-04-12

## 2024-07-29 ENCOUNTER — ANCILLARY PROCEDURE (OUTPATIENT)
Dept: MAMMOGRAPHY | Facility: CLINIC | Age: 44
End: 2024-07-29
Payer: COMMERCIAL

## 2024-07-29 DIAGNOSIS — Z12.31 VISIT FOR SCREENING MAMMOGRAM: ICD-10-CM

## 2024-07-29 PROCEDURE — 77063 BREAST TOMOSYNTHESIS BI: CPT

## 2024-10-03 DIAGNOSIS — R73.03 PREDIABETES: ICD-10-CM

## 2024-10-03 RX ORDER — METFORMIN HYDROCHLORIDE 500 MG/1
500 TABLET, EXTENDED RELEASE ORAL
Qty: 60 TABLET | Refills: 0 | Status: SHIPPED | OUTPATIENT
Start: 2024-10-03 | End: 2024-10-31

## 2024-10-31 ENCOUNTER — TELEPHONE (OUTPATIENT)
Dept: FAMILY MEDICINE | Facility: CLINIC | Age: 44
End: 2024-10-31
Payer: COMMERCIAL

## 2024-10-31 DIAGNOSIS — R73.03 PREDIABETES: ICD-10-CM

## 2024-10-31 RX ORDER — METFORMIN HYDROCHLORIDE 500 MG/1
500 TABLET, EXTENDED RELEASE ORAL
Qty: 90 TABLET | Refills: 0 | Status: SHIPPED | OUTPATIENT
Start: 2024-10-31

## 2024-10-31 NOTE — TELEPHONE ENCOUNTER
Medication Question or Refill        What medication are you calling about (include dose and sig)?:    Disp Refills Start End CAIO   metFORMIN (GLUCOPHAGE XR) 500 MG 24 hr tablet 60 tablet 0 10/3/2024 -- No   Sig - Route: Take 1 tablet (500 mg) by mouth daily (with dinner). - Oral     Who prescribed the medication?: Margaux Huber    Do you have any questions or concerns?  Patient is requesting 90 day supply    Preferred Pharmacy:   [facility] [id number] [place] [facility] - [place], [place] - [address] [place] [address]  Phone: [phone] Fax: [phone]      Controlled Substance Agreement on file:   CSA -- Patient Level:    CSA: None found at the patient level.

## 2025-02-04 ENCOUNTER — TELEPHONE (OUTPATIENT)
Dept: GASTROENTEROLOGY | Facility: CLINIC | Age: 45
End: 2025-02-04
Payer: COMMERCIAL

## 2025-02-04 ENCOUNTER — PATIENT OUTREACH (OUTPATIENT)
Dept: GASTROENTEROLOGY | Facility: CLINIC | Age: 45
End: 2025-02-04
Payer: COMMERCIAL

## 2025-02-04 DIAGNOSIS — Z12.11 SPECIAL SCREENING FOR MALIGNANT NEOPLASMS, COLON: Primary | ICD-10-CM

## 2025-02-04 NOTE — TELEPHONE ENCOUNTER
"Endoscopy Scheduling Screen    Have you had any respiratory illness or flu-like symptoms in the last 10 days?  No    What is your communication preference for Instructions and/or Bowel Prep?   MyChart    What insurance is in the chart?  Other:  BCBS    Ordering/Referring Provider:   PB VALDES        (If ordering provider performs procedure, schedule with ordering provider unless otherwise instructed. )    BMI: Estimated body mass index is 31.63 kg/m  as calculated from the following:    Height as of 3/20/24: 1.727 m (5' 8\").    Weight as of 3/20/24: 94.3 kg (208 lb).     Sedation Ordered  moderate sedation.   If patient BMI > 50 do not schedule in ASC.    If patient BMI > 45 do not schedule at [facility].    Are you taking methadone or Suboxone?  NO, No RN review required.    Have you been diagnosed and are being treated for severe PTSD or severe anxiety?  NO, No RN review required.    Are you taking any prescription medications for pain 3 or more times per week?   NO, No RN review required.    Do you have a history of malignant hyperthermia?  No    (Females) Are you currently pregnant?   No     Have you been diagnosed or told you have pulmonary hypertension?   No    Do you have an LVAD?  No    Have you been told you have moderate to severe sleep apnea?  No.    Have you been told you have COPD, asthma, or any other lung disease?  No    Do you have any heart conditions?  No     Have you ever had or are you waiting for an organ transplant?  No. Continue scheduling, no site restrictions.    Have you had a stroke or transient ischemic attack (TIA aka \"mini stroke\" in the last 6 months?   No    Have you been diagnosed with or been told you have cirrhosis of the liver?   No.    Are you currently on dialysis?   No    Do you need assistance transferring?   No    BMI: Estimated body mass index is 31.63 kg/m  as calculated from the following:    Height as of 3/20/24: 1.727 m (5' 8\").    Weight as of 3/20/24: 94.3 kg (208 lb). "     Is patients BMI > 40 and scheduling location [facility]?  No    Do you take an injectable or oral medication for weight loss or diabetes (excluding insulin)?  Yes, hold time can be up to 7 days. Please consult with you prescribing provider to discuss endoscopy recommendations. (Please schedule at least 7 days out.) SEMAGLUTIDE, METFORMIN    Do you take the medication Naltrexone?  No    Do you take blood thinners?  No       Prep   Are you currently on dialysis or do you have chronic kidney disease?  No    Do you have a diagnosis of diabetes?  No    Do you have a diagnosis of cystic fibrosis (CF)?  No    On a regular basis do you go 3 -5 days between bowel movements?  No    BMI > 40?  No    Preferred Pharmacy:    [facility] [id number] - [place], [place] - [id number] [place] [address]  Phone: [phone] Fax: [phone]      Final Scheduling Details     Procedure scheduled  Colonoscopy    Surgeon:  SHANNAN     Date of procedure:  04/28/2025     Pre-OP / PAC:   No - Not required for this site.    Location  [facility] - Patient preference.    Sedation   MAC/Deep Sedation  PER LOCATION      Patient Reminders:   You will receive a call from a Nurse to review instructions and health history.  This assessment must be completed prior to your procedure.  Failure to complete the Nurse assessment may result in the procedure being cancelled.      On the day of your procedure, please designate an adult(s) who can drive you home stay with you for the next 24 hours. The medicines used in the exam will make you sleepy. You will not be able to drive.      You cannot take public transportation, ride share services, or non-medical taxi service without a responsible caregiver.  Medical transport services are allowed with the requirement that a responsible caregiver will receive you at your destination.  We require that drivers and caregivers are confirmed prior to your procedure.

## 2025-02-04 NOTE — PROGRESS NOTES
"Patient called endoscopy scheduling line for colonoscopy order. Patient has not been screened before but is of screening age, will place order.  CRC Screening Colonoscopy Referral Review    Patient meets the inclusion criteria for screening colonoscopy standing order.    Ordering/Referring Provider:  Margaux Huber    BMI: Estimated body mass index is 31.63 kg/m  as calculated from the following:    Height as of 3/20/24: 1.727 m (5' 8\").    Weight as of 3/20/24: 94.3 kg (208 lb).     Sedation:  Does patient have any of the following conditions affecting sedation?  No medical conditions affecting sedation.    Previous Scopes:  Any previous recommendations or follow up needs based on previous scope?  na / No recommendations.    Medical Concerns to Postpone Order:  Does patient have any of the following medical concerns that should postpone/delay colonoscopy referral?  No medical conditions affecting colonoscopy referral.    Final Referral Details:  Based on patient's medical history patient is appropriate for referral order with moderate sedation. If patient's BMI > 50 do not schedule in ASC.  "

## 2025-03-24 SDOH — HEALTH STABILITY: PHYSICAL HEALTH: ON AVERAGE, HOW MANY MINUTES DO YOU ENGAGE IN EXERCISE AT THIS LEVEL?: 60 MIN

## 2025-03-24 SDOH — HEALTH STABILITY: PHYSICAL HEALTH: ON AVERAGE, HOW MANY DAYS PER WEEK DO YOU ENGAGE IN MODERATE TO STRENUOUS EXERCISE (LIKE A BRISK WALK)?: 2 DAYS

## 2025-03-24 ASSESSMENT — SOCIAL DETERMINANTS OF HEALTH (SDOH): HOW OFTEN DO YOU GET TOGETHER WITH FRIENDS OR RELATIVES?: ONCE A WEEK

## 2025-03-26 ENCOUNTER — OFFICE VISIT (OUTPATIENT)
Dept: FAMILY MEDICINE | Facility: CLINIC | Age: 45
End: 2025-03-26
Payer: COMMERCIAL

## 2025-03-26 VITALS
BODY MASS INDEX: 28.58 KG/M2 | TEMPERATURE: 98 F | RESPIRATION RATE: 20 BRPM | SYSTOLIC BLOOD PRESSURE: 125 MMHG | DIASTOLIC BLOOD PRESSURE: 90 MMHG | HEART RATE: 76 BPM | WEIGHT: 193 LBS | HEIGHT: 69 IN | OXYGEN SATURATION: 94 %

## 2025-03-26 DIAGNOSIS — Z86.39 HISTORY OF OBESITY: ICD-10-CM

## 2025-03-26 DIAGNOSIS — Z00.00 ROUTINE GENERAL MEDICAL EXAMINATION AT A HEALTH CARE FACILITY: Primary | ICD-10-CM

## 2025-03-26 DIAGNOSIS — F33.1 MODERATE EPISODE OF RECURRENT MAJOR DEPRESSIVE DISORDER (H): ICD-10-CM

## 2025-03-26 DIAGNOSIS — R73.03 PREDIABETES: ICD-10-CM

## 2025-03-26 LAB
CHOLEST SERPL-MCNC: 261 MG/DL
EST. AVERAGE GLUCOSE BLD GHB EST-MCNC: 111 MG/DL
FASTING STATUS PATIENT QL REPORTED: NO
HBA1C MFR BLD: 5.5 % (ref 0–5.6)
HDLC SERPL-MCNC: 57 MG/DL
LDLC SERPL CALC-MCNC: 155 MG/DL
NONHDLC SERPL-MCNC: 204 MG/DL
TRIGL SERPL-MCNC: 245 MG/DL
TSH SERPL DL<=0.005 MIU/L-ACNC: 2.69 UIU/ML (ref 0.3–4.2)

## 2025-03-26 RX ORDER — BUPROPION HYDROCHLORIDE 150 MG/1
300 TABLET ORAL EVERY MORNING
Qty: 180 TABLET | Refills: 3 | Status: SHIPPED | OUTPATIENT
Start: 2025-03-26

## 2025-03-26 ASSESSMENT — PATIENT HEALTH QUESTIONNAIRE - PHQ9
SUM OF ALL RESPONSES TO PHQ QUESTIONS 1-9: 2
10. IF YOU CHECKED OFF ANY PROBLEMS, HOW DIFFICULT HAVE THESE PROBLEMS MADE IT FOR YOU TO DO YOUR WORK, TAKE CARE OF THINGS AT HOME, OR GET ALONG WITH OTHER PEOPLE: NOT DIFFICULT AT ALL
SUM OF ALL RESPONSES TO PHQ QUESTIONS 1-9: 2

## 2025-03-26 NOTE — PATIENT INSTRUCTIONS
Patient Education   Preventive Care Advice   This is general advice given by our system to help you stay healthy. However, your care team may have specific advice just for you. Please talk to your care team about your preventive care needs.  Nutrition  Eat 5 or more servings of fruits and vegetables each day.  Try wheat bread, brown rice and whole grain pasta (instead of white bread, rice, and pasta).  Get enough calcium and vitamin D. Check the label on foods and aim for 100% of the RDA (recommended daily allowance).  Lifestyle  Exercise at least 150 minutes each week  (30 minutes a day, 5 days a week).  Do muscle strengthening activities 2 days a week. These help control your weight and prevent disease.  No smoking.  Wear sunscreen to prevent skin cancer.  Have a dental exam and cleaning every 6 months.  Yearly exams  See your health care team every year to talk about:  Any changes in your health.  Any medicines your care team has prescribed.  Preventive care, family planning, and ways to prevent chronic diseases.  Shots (vaccines)   HPV shots (up to age 26), if you've never had them before.  Hepatitis B shots (up to age 59), if you've never had them before.  COVID-19 shot: Get this shot when it's due.  Flu shot: Get a flu shot every year.  Tetanus shot: Get a tetanus shot every 10 years.  Pneumococcal, hepatitis A, and RSV shots: Ask your care team if you need these based on your risk.  Shingles shot (for age 50 and up)  General health tests  Diabetes screening:  Starting at age 35, Get screened for diabetes at least every 3 years.  If you are younger than age 35, ask your care team if you should be screened for diabetes.  Cholesterol test: At age 39, start having a cholesterol test every 5 years, or more often if advised.  Bone density scan (DEXA): At age 50, ask your care team if you should have this scan for osteoporosis (brittle bones).  Hepatitis C: Get tested at least once in your life.  STIs (sexually  transmitted infections)  Before age 24: Ask your care team if you should be screened for STIs.  After age 24: Get screened for STIs if you're at risk. You are at risk for STIs (including HIV) if:  You are sexually active with more than one person.  You don't use condoms every time.  You or a partner was diagnosed with a sexually transmitted infection.  If you are at risk for HIV, ask about PrEP medicine to prevent HIV.  Get tested for HIV at least once in your life, whether you are at risk for HIV or not.  Cancer screening tests  Cervical cancer screening: If you have a cervix, begin getting regular cervical cancer screening tests starting at age 21.  Breast cancer scan (mammogram): If you've ever had breasts, begin having regular mammograms starting at age 40. This is a scan to check for breast cancer.  Colon cancer screening: It is important to start screening for colon cancer at age 45.  Have a colonoscopy test every 10 years (or more often if you're at risk) Or, ask your provider about stool tests like a FIT test every year or Cologuard test every 3 years.  To learn more about your testing options, visit:   .  For help making a decision, visit:   https://bit.ly/en94759.  Prostate cancer screening test: If you have a prostate, ask your care team if a prostate cancer screening test (PSA) at age 55 is right for you.  Lung cancer screening: If you are a current or former smoker ages 50 to 80, ask your care team if ongoing lung cancer screenings are right for you.  For informational purposes only. Not to replace the advice of your health care provider. Copyright   2023 [email or web address]. All rights reserved. Clinically reviewed by the [facility] Transitions Program. [employer] [id number] - REV 01/24.

## 2025-03-26 NOTE — PROGRESS NOTES
Preventive Care Visit  [facility]  Staci Arredondo MD, Family Medicine  Mar 26, 2025      Assessment & Plan     Routine general medical examination at a health care facility  Colonoscopy ordered in Feb 2025  Tdap due Oct 2025  Mammo due in July 2025  Former smoker, 15 pack-year history.  No lung cancer screening necessary.  Family planning - taking OCP  Pap/HPV neg 3/20/24, due March 2029  BP borderline, recommend recheck in 1-2 mo    Prediabetes  History of obesity  Lipids borderline,  on 3/20/24  PreDM, A1c 5.9% on 3/20/24, has since started semaglutide and has lost 17 lbs  - ordered Hemoglobin A1c and lipid today  - continue semaglutide 1.2 mg weekly  - will discontinue metformin if A1c improving    Moderate episode of recurrent major depressive disorder (H)  Currently stable but symptoms have slightly worsened over this past winter.   History of sexual side effects with higher dose of sertraline, therefore, will trial an increased dose of wellbutrin  - continue sertraline 50 mg daily  - increase wellbutrin XL from 150 mg to 300 mg daily  - TSH with free T4 reflex      Counseling  Appropriate preventive services were addressed with this patient via screening, questionnaire, or discussion as appropriate for fall prevention, nutrition, physical activity, Tobacco-use cessation, social engagement, weight loss and cognition.  Checklist reviewing preventive services available has been given to the patient.  Reviewed patient's diet, addressing concerns and/or questions.   She is at risk for lack of exercise and has been provided with information to increase physical activity for the benefit of her well-being.     Return in about 4 weeks (around 4/23/2025).    ======================    Subjective   Eri is a 44 year old, presenting for the following:  Establish Care, Physical, Recheck Medication, and Refill Request        3/26/2025    10:01 AM   Additional Questions   Roomed by  Katrina vicente         3/26/2025    Information    services provided? No          HPI    Here to establish care.  This clinic is near her home, looking for continuity of care.  Today, has the following concerns.     Mental health -   Long history of MDD, anxiety.  Started sertraline in 2020.  Decreased dose in 2021 from 75 mg to 50 mg due to sexual side effects.   Started wellbutrin in 2021.  Currently on 150 mg and has this dose been going well.   History of SI 5 years ago  Felt more winter blues this year.   Improving with spring weather, but still feels a little more depression symptoms recently.     History of obesity-  Currently taking metformin and semaglutide.  Currently taking semaglutide 1.2 mg weekly.  Started in October, lost 17 lbs since then.  No side effects.                 3/24/2025   General Health   How would you rate your overall physical health? Good   Feel stress (tense, anxious, or unable to sleep) Only a little   (!) STRESS CONCERN      3/24/2025   Nutrition   Three or more servings of calcium each day? Yes   Diet: Regular (no restrictions)   How many servings of fruit and vegetables per day? (!) 2-3   How many sweetened beverages each day? 0-1         3/24/2025   Exercise   Days per week of moderate/strenous exercise 2 days   Average minutes spent exercising at this level 60 min   (!) EXERCISE CONCERN      3/24/2025   Social Factors   Frequency of gathering with friends or relatives Once a week   Worry food won't last until get money to buy more No   Food not last or not have enough money for food? No   Do you have housing? (Housing is defined as stable permanent housing and does not include staying ouside in a car, in a tent, in an abandoned building, in an overnight shelter, or couch-surfing.) Yes   Are you worried about losing your housing? No   Lack of transportation? No   Unable to get utilities (heat,electricity)? No         3/24/2025   Dental   Dentist two times  every year? Yes         Today's PHQ-9 Score:       3/26/2025     9:57 AM   PHQ-9 SCORE   PHQ-9 Total Score MyChart 2 (Minimal depression)   PHQ-9 Total Score 2        Patient-reported         3/24/2025   Substance Use   Alcohol more than 3/day or more than 7/wk No   Do you use any other substances recreationally? No     Social History     Tobacco Use    Smoking status: Former    Smokeless tobacco: Never    Tobacco comments:     1 ppd for 15 years, quit 2010   Substance Use Topics    Alcohol use: Yes     Alcohol/week: 0.0 - 1.0 standard drinks of alcohol    Drug use: No           3/18/2024   LAST FHS-7 RESULTS   1st degree relative breast or ovarian cancer No   Any relative bilateral breast cancer No   Any male have breast cancer No   Any ONE woman have BOTH breast AND ovarian cancer No   Any woman with breast cancer before 50yrs No   2 or more relatives with breast AND/OR ovarian cancer No   2 or more relatives with breast AND/OR bowel cancer Yes        Mammogram Screening - Mammogram every 1-2 years updated in Health Maintenance based on mutual decision making        3/24/2025   STI Screening   New sexual partner(s) since last STI/HIV test? No     History of abnormal Pap smear: No - age 30- 64 PAP with HPV every 5 years recommended        Latest Ref Rng & Units 3/20/2024    10:31 AM 2/25/2019     4:35 PM 10/27/2015     8:36 AM   PAP / HPV   PAP  Negative for Intraepithelial Lesion or Malignancy (NILM)  Negative for squamous intraepithelial lesion or malignancy  Electronically signed by Prudence Wolf CT (ASCP) on 3/1/2019 at 12:59 PM    Negative for squamous intraepithelial lesion or malignancy  Electronically signed by Emi Cuello CT (ASCP) on 11/9/2015 at  4:02 PM      HPV 16 DNA Negative Negative  Negative     HPV 18 DNA Negative Negative  Negative     Other HR HPV Negative Negative  Negative       ASCVD Risk   The 10-year ASCVD risk score (Juan SYED, et al., 2019) is: 1.1%    Values used to  "calculate the score:      Age: 44 years      Sex: Female      Is Non- : No      Diabetic: No      Tobacco smoker: No      Systolic Blood Pressure: 125 mmHg      Is BP treated: No      HDL Cholesterol: 57 mg/dL      Total Cholesterol: 261 mg/dL        3/24/2025   Contraception/Family Planning   Questions about contraception or family planning No        Reviewed and updated as needed this visit by Provider   Tobacco  Allergies  Meds  Problems  Med Hx  Surg Hx  Fam Hx  Soc   Hx Sexual Activity          Patient Active Problem List   Diagnosis    History of PCOS    CHARANJIT (generalized anxiety disorder)    Moderate episode of recurrent major depressive disorder (H)     Past Surgical History:   Procedure Laterality Date    ZZC REMOVAL OF OVARY(S)      Description: Oophorectomy Unilateral Right Side;  Recorded: 06/24/2014;       Social History     Tobacco Use    Smoking status: Former    Smokeless tobacco: Never    Tobacco comments:     1 ppd for 15 years, quit 2010   Substance Use Topics    Alcohol use: Yes     Alcohol/week: 0.0 - 1.0 standard drinks of alcohol     Family History   Problem Relation Age of Onset    Multiple Sclerosis Mother     Colon Cancer Mother 72    Diabetes Father     Heart Disease Father 55        s/p bypass    Diabetes Paternal Grandmother     Colon Cancer Paternal Grandfather 80    Diabetes Paternal Aunt     Colon Cancer Paternal Aunt 75    Diabetes Paternal Aunt     Diabetes Paternal Uncle     Breast Cancer No family hx of     Prostate Cancer No family hx of               Objective    Exam  /90   Pulse 76   Temp 98  F (36.7  C) (Oral)   Resp 20   Ht 1.745 m (5' 8.7\")   Wt 87.5 kg (193 lb)   SpO2 94%   BMI 28.75 kg/m     Estimated body mass index is 28.75 kg/m  as calculated from the following:    Height as of this encounter: 1.745 m (5' 8.7\").    Weight as of this encounter: 87.5 kg (193 lb).    Physical Exam  GENERAL: alert and no distress  NECK: no " adenopathy, no asymmetry, masses, or scars  RESP: lungs clear to auscultation - no rales, rhonchi or wheezes  CV: regular rate and rhythm, normal S1 S2, no S3 or S4, no murmur, click or rub, no peripheral edema  ABDOMEN: soft, nontender, no hepatosplenomegaly, no masses and bowel sounds normal  MS: no gross musculoskeletal defects noted, no edema  PSYCH: mentation appears normal, affect normal/bright    Hemoglobin A1C   Date Value Ref Range Status   03/26/2025 5.5 0.0 - 5.6 % Final     Comment:     Normal <5.7%   Prediabetes 5.7-6.4%    Diabetes 6.5% or higher     Note: Adopted from ADA consensus guidelines.   03/20/2024 5.9 (H) 0.0 - 5.6 % Final     Comment:     Normal <5.7%   Prediabetes 5.7-6.4%    Diabetes 6.5% or higher     Note: Adopted from ADA consensus guidelines.   06/16/2022 5.9 (H) 0.0 - 5.6 % Final     Comment:     Normal <5.7%   Prediabetes 5.7-6.4%    Diabetes 6.5% or higher     Note: Adopted from ADA consensus guidelines.         Signed Electronically by: Staci Arredondo MD    Answers submitted by the patient for this visit:  Patient Health Questionnaire (Submitted on 3/26/2025)  If you checked off any problems, how difficult have these problems made it for you to do your work, take care of things at home, or get along with other people?: Not difficult at all  PHQ9 TOTAL SCORE: 2

## 2025-04-07 ENCOUNTER — TELEPHONE (OUTPATIENT)
Dept: GASTROENTEROLOGY | Facility: CLINIC | Age: 45
End: 2025-04-07
Payer: COMMERCIAL

## 2025-04-07 DIAGNOSIS — Z12.11 ENCOUNTER FOR SCREENING FOR MALIGNANT NEOPLASM OF COLON: ICD-10-CM

## 2025-04-07 DIAGNOSIS — Z12.11 ENCOUNTER FOR SCREENING FOR MALIGNANT NEOPLASM OF COLON: Primary | ICD-10-CM

## 2025-04-07 RX ORDER — POLYETHYLENE GLYCOL 3350 17 G/17G
POWDER, FOR SOLUTION ORAL
Qty: 238 G | Refills: 0 | Status: SHIPPED | OUTPATIENT
Start: 2025-04-07

## 2025-04-07 RX ORDER — BISACODYL 5 MG/1
TABLET, DELAYED RELEASE ORAL
Qty: 4 TABLET | Refills: 0 | Status: SHIPPED | OUTPATIENT
Start: 2025-04-07

## 2025-04-07 RX ORDER — BISACODYL 5 MG/1
TABLET, DELAYED RELEASE ORAL
Qty: 4 TABLET | Refills: 0 | Status: SHIPPED | OUTPATIENT
Start: 2025-04-07 | End: 2025-04-07

## 2025-04-07 NOTE — TELEPHONE ENCOUNTER
Bowel Prep Review:  Disclaimer: No call was made to the patient.     Extended Golytely bowel prep. Bowel prep sent to [facility] [id number] [facility] - [place], [place] - [address]   Recommended due to GLP-1 agonist medication noted in chart.   Instructions were sent via [employer].       Jackelyn Manuel RN  Endoscopy Procedure Pre Assessment

## 2025-04-07 NOTE — TELEPHONE ENCOUNTER
Insurance only covers one Golytely container. Updated bowel prep recommendation of low volume Golytely extended prep. Bowel prep sent to [facility] [id number] [facility] - [place], [place] - [address] Updated instructions sent via [employer].     Jackelyn Manuel RN  Endoscopy Procedure Pre Assessment   [phone] option 3

## 2025-04-09 ENCOUNTER — TELEPHONE (OUTPATIENT)
Dept: FAMILY MEDICINE | Facility: CLINIC | Age: 45
End: 2025-04-09

## 2025-04-09 ENCOUNTER — OFFICE VISIT (OUTPATIENT)
Dept: FAMILY MEDICINE | Facility: CLINIC | Age: 45
End: 2025-04-09
Payer: COMMERCIAL

## 2025-04-09 VITALS
TEMPERATURE: 97.7 F | HEART RATE: 92 BPM | RESPIRATION RATE: 16 BRPM | HEIGHT: 68 IN | OXYGEN SATURATION: 97 % | SYSTOLIC BLOOD PRESSURE: 129 MMHG | DIASTOLIC BLOOD PRESSURE: 89 MMHG | WEIGHT: 191 LBS | BODY MASS INDEX: 28.95 KG/M2

## 2025-04-09 DIAGNOSIS — Z86.39 HISTORY OF OBESITY: ICD-10-CM

## 2025-04-09 DIAGNOSIS — F33.1 MODERATE EPISODE OF RECURRENT MAJOR DEPRESSIVE DISORDER (H): ICD-10-CM

## 2025-04-09 DIAGNOSIS — I10 STAGE 1 HYPERTENSION: Primary | ICD-10-CM

## 2025-04-09 ASSESSMENT — PATIENT HEALTH QUESTIONNAIRE - PHQ9
10. IF YOU CHECKED OFF ANY PROBLEMS, HOW DIFFICULT HAVE THESE PROBLEMS MADE IT FOR YOU TO DO YOUR WORK, TAKE CARE OF THINGS AT HOME, OR GET ALONG WITH OTHER PEOPLE: NOT DIFFICULT AT ALL
SUM OF ALL RESPONSES TO PHQ QUESTIONS 1-9: 2
SUM OF ALL RESPONSES TO PHQ QUESTIONS 1-9: 2

## 2025-04-09 NOTE — PROGRESS NOTES
"  Assessment & Plan     Stage 1 hypertension  Borderline elevation of blood pressure at last 2 office visits.  Systolic pressure has been around 130 while diastolic has been at or above 90.  Discussed management options and patient would like to continue with trial of therapeutic lifestyle change for treatment.  She has been losing weight since starting a GLP-1 and thinks this may improve her blood pressure.  Patient will check blood pressure periodically out of the office.  Will return if it is above 140/90.  Otherwise, plan to follow-up in 6 months    History of obesity  Patient has been experiencing weight loss after initiation of a GLP-1, semaglutide.  She is currently on 1.2 mg weekly and plans to stay at this dose per the prescribing provider.  Had a history of prediabetes which has resolved.  A1c improved from 5.9% to 5.5%.  She also has a borderline lipids with an LDL of 155.  This may continue to improve with weight loss as well.  Patient has minimal red meat or saturated fat in her diet.  She plans to increase activity now that it is warmer outside.  - Check hemoglobin A1c in 6 months, recheck fasting lipids in 1 year    Moderate episode of recurrent major depressive disorder (H)  Mood has been improving with recent increase of Wellbutrin from 150 to 300 mg daily.  Will continue with Wellbutrin 300 mg daily, sertraline 50 mg daily (higher doses caused sexual side effects).  Patient plans to follow-up if any changes in mood occur, otherwise recheck in 6 months.    HCM  Tdap due Oct 2025  Mammo due in July 2025  Has colonoscopy scheduled    BMI  Estimated body mass index is 29.04 kg/m  as calculated from the following:    Height as of this encounter: 1.727 m (5' 8\").    Weight as of this encounter: 86.6 kg (191 lb).   Weight management plan: Discussed healthy diet and exercise guidelines Continue with medication management.      Return in about 6 months (around " 10/9/2025).    =========================      Subjective   Eri is a 44 year old, presenting for the following health issues:  Hypertension (BP follow up. ) and Imm/Inj (Declined both covid and Hep B. )        4/9/2025    11:31 AM   Additional Questions   Roomed by Alejandra   Accompanied by Self         4/9/2025    Information    services provided? No       Seen recently to establish care.  BP was slightly elevated.  Here today for a BP recheck.    BP-  Has BP cuff at home but thinks it is inaccurate.  No HA, visual changes, chest pain.     PreDM-  Improved since semaglutide.  A1c was 5.9%, now 5.5%  Stopped metformin recently due to improvement of A1c.     Depression-  Mood had worsened over the winter.  Taking sertraline 50 mg daily and had increased wellbutrin XL from 150 mg to 300 mg daily at appt 2 weeks ago.  Has a little more energy and improved mood now.  No change in sleep or appetite.     FH  Father - heart attack or stroke, HTN  DM2 on father's side.        Objective      There is no height or weight on file to calculate BMI.  Physical Exam   GENERAL: alert and no distress  EYES: Eyes grossly normal to inspection.  No discharge or erythema, or obvious scleral/conjunctival abnormalities.  RESP: No audible wheeze, cough, or visible cyanosis.    SKIN: Visible skin clear. No significant rash, abnormal pigmentation or lesions.  NEURO: Cranial nerves grossly intact.  Mentation and speech appropriate for age.  PSYCH: Appropriate affect, tone, and pace of words    Office Visit on 03/26/2025   Component Date Value Ref Range Status    Estimated Average Glucose 03/26/2025 111  <117 mg/dL Final    Hemoglobin A1C 03/26/2025 5.5  0.0 - 5.6 % Final    Normal <5.7%   Prediabetes 5.7-6.4%    Diabetes 6.5% or higher     Note: Adopted from ADA consensus guidelines.    Cholesterol 03/26/2025 261 (H)  <200 mg/dL Final    Triglycerides 03/26/2025 245 (H)  <150 mg/dL Final    Direct Measure HDL 03/26/2025  57  >=50 mg/dL Final    LDL Cholesterol Calculated 03/26/2025 155 (H)  <100 mg/dL Final    Non HDL Cholesterol 03/26/2025 204 (H)  <130 mg/dL Final    Patient Fasting > 8hrs? 03/26/2025 No   Final    TSH 03/26/2025 2.69  0.30 - 4.20 uIU/mL Final           The longitudinal plan of care for the diagnosis(es)/condition(s) as documented were addressed during this visit. Due to the added complexity in care, I will continue to support Eri in the subsequent management and with ongoing continuity of care.      Signed Electronically by: Staci Arredondo MD

## 2025-04-09 NOTE — TELEPHONE ENCOUNTER
Called and lvm. Dr. Arredondo would like to know if patient can come in at 1130 or if we can reschedule.     When patient return call, please inform me if patient is coming in at 1130 or if we are rescheduling. Thanks.

## 2025-04-09 NOTE — Clinical Note
Hi ! Can you put in a reminder for patient to schedule an appt in 6 months?  Thanks! Staci Arredondo MD

## 2025-04-10 PROBLEM — I10 STAGE 1 HYPERTENSION: Status: ACTIVE | Noted: 2025-04-10

## 2025-04-10 RX ORDER — POLYETHYLENE GLYCOL-3350 AND ELECTROLYTES WITH FLAVOR PACK 240; 5.84; 2.98; 6.72; 22.72 G/278.26G; G/278.26G; G/278.26G; G/278.26G; G/278.26G
POWDER, FOR SOLUTION ORAL
Refills: 0 | OUTPATIENT
Start: 2025-04-10

## 2025-04-28 ENCOUNTER — HOSPITAL ENCOUNTER (OUTPATIENT)
Facility: AMBULATORY SURGERY CENTER | Age: 45
Discharge: HOME | End: 2025-04-28
Attending: SURGERY
Payer: COMMERCIAL

## 2025-04-28 ENCOUNTER — ANESTHESIA EVENT (OUTPATIENT)
Dept: SURGERY | Facility: AMBULATORY SURGERY CENTER | Age: 45
End: 2025-04-28
Payer: COMMERCIAL

## 2025-04-28 ENCOUNTER — ANESTHESIA (OUTPATIENT)
Dept: SURGERY | Facility: AMBULATORY SURGERY CENTER | Age: 45
End: 2025-04-28
Payer: COMMERCIAL

## 2025-04-28 VITALS
BODY MASS INDEX: 28.95 KG/M2 | RESPIRATION RATE: 16 BRPM | SYSTOLIC BLOOD PRESSURE: 130 MMHG | OXYGEN SATURATION: 100 % | TEMPERATURE: 96.8 F | DIASTOLIC BLOOD PRESSURE: 80 MMHG | HEART RATE: 78 BPM | HEIGHT: 68 IN | WEIGHT: 191 LBS

## 2025-04-28 LAB — COLONOSCOPY: NORMAL

## 2025-04-28 RX ORDER — PROPOFOL 10 MG/ML
INJECTION, EMULSION INTRAVENOUS CONTINUOUS PRN
Status: DISCONTINUED | OUTPATIENT
Start: 2025-04-28 | End: 2025-04-28

## 2025-04-28 RX ORDER — NALOXONE HYDROCHLORIDE 0.4 MG/ML
0.1 INJECTION, SOLUTION INTRAMUSCULAR; INTRAVENOUS; SUBCUTANEOUS
Status: DISCONTINUED | OUTPATIENT
Start: 2025-04-28 | End: 2025-04-29 | Stop reason: HOSPADM

## 2025-04-28 RX ORDER — ONDANSETRON 2 MG/ML
4 INJECTION INTRAMUSCULAR; INTRAVENOUS EVERY 30 MIN PRN
Status: DISCONTINUED | OUTPATIENT
Start: 2025-04-28 | End: 2025-04-29 | Stop reason: HOSPADM

## 2025-04-28 RX ORDER — PROPOFOL 10 MG/ML
INJECTION, EMULSION INTRAVENOUS PRN
Status: DISCONTINUED | OUTPATIENT
Start: 2025-04-28 | End: 2025-04-28

## 2025-04-28 RX ORDER — SODIUM CHLORIDE, SODIUM LACTATE, POTASSIUM CHLORIDE, CALCIUM CHLORIDE 600; 310; 30; 20 MG/100ML; MG/100ML; MG/100ML; MG/100ML
INJECTION, SOLUTION INTRAVENOUS CONTINUOUS
Status: DISCONTINUED | OUTPATIENT
Start: 2025-04-28 | End: 2025-04-29 | Stop reason: HOSPADM

## 2025-04-28 RX ORDER — LIDOCAINE HYDROCHLORIDE 20 MG/ML
INJECTION, SOLUTION INFILTRATION; PERINEURAL PRN
Status: DISCONTINUED | OUTPATIENT
Start: 2025-04-28 | End: 2025-04-28

## 2025-04-28 RX ORDER — ONDANSETRON 4 MG/1
4 TABLET, ORALLY DISINTEGRATING ORAL EVERY 30 MIN PRN
Status: DISCONTINUED | OUTPATIENT
Start: 2025-04-28 | End: 2025-04-29 | Stop reason: HOSPADM

## 2025-04-28 RX ORDER — DEXAMETHASONE SODIUM PHOSPHATE 4 MG/ML
4 INJECTION, SOLUTION INTRA-ARTICULAR; INTRALESIONAL; INTRAMUSCULAR; INTRAVENOUS; SOFT TISSUE
Status: DISCONTINUED | OUTPATIENT
Start: 2025-04-28 | End: 2025-04-29 | Stop reason: HOSPADM

## 2025-04-28 RX ORDER — LIDOCAINE 40 MG/G
CREAM TOPICAL
Status: DISCONTINUED | OUTPATIENT
Start: 2025-04-28 | End: 2025-04-29 | Stop reason: HOSPADM

## 2025-04-28 RX ADMIN — SODIUM CHLORIDE, SODIUM LACTATE, POTASSIUM CHLORIDE, CALCIUM CHLORIDE: 600; 310; 30; 20 INJECTION, SOLUTION INTRAVENOUS at 09:08

## 2025-04-28 RX ADMIN — PROPOFOL 50 MG: 10 INJECTION, EMULSION INTRAVENOUS at 09:29

## 2025-04-28 RX ADMIN — PROPOFOL 200 MCG/KG/MIN: 10 INJECTION, EMULSION INTRAVENOUS at 09:29

## 2025-04-28 RX ADMIN — PROPOFOL 30 MG: 10 INJECTION, EMULSION INTRAVENOUS at 09:33

## 2025-04-28 RX ADMIN — LIDOCAINE HYDROCHLORIDE 2.5 ML: 20 INJECTION, SOLUTION INFILTRATION; PERINEURAL at 09:29

## 2025-04-28 ASSESSMENT — LIFESTYLE VARIABLES: TOBACCO_USE: 0

## 2025-04-28 ASSESSMENT — ENCOUNTER SYMPTOMS
SEIZURES: 0
DYSRHYTHMIAS: 0

## 2025-04-28 ASSESSMENT — COPD QUESTIONNAIRES: COPD: 0

## 2025-04-28 NOTE — ANESTHESIA POSTPROCEDURE EVALUATION
Patient: Eri Forbes    Procedure: Procedure(s):  COLONOSCOPY       Anesthesia Type:  MAC    Note:  Disposition: Outpatient   Postop Pain Control: Uneventful            Sign Out: Well controlled pain   PONV: No   Neuro/Psych: Uneventful            Sign Out: Acceptable/Baseline neuro status   Airway/Respiratory: Uneventful            Sign Out: Acceptable/Baseline resp. status   CV/Hemodynamics: Uneventful            Sign Out: Acceptable CV status; No obvious hypovolemia; No obvious fluid overload   Other NRE: NONE   DID A NON-ROUTINE EVENT OCCUR? No           Last vitals:  Vitals Value Taken Time   /80 04/28/25 1031   Temp 96.8  F (36  C) 04/28/25 1031   Pulse 78 04/28/25 1044   Resp 16 04/28/25 1031   SpO2 95 % 04/28/25 1044   Vitals shown include unfiled device data.    Electronically Signed By: Rhea Lindsay MD  April 28, 2025  11:13 AM

## 2025-04-28 NOTE — ANESTHESIA CARE TRANSFER NOTE
Patient: Eri Forbes    Procedure: Procedure(s):  COLONOSCOPY       Diagnosis: Screen for colon cancer [Z12.11]  Diagnosis Additional Information: No value filed.    Anesthesia Type:   MAC     Note:    Oropharynx: oropharynx clear of all foreign objects and spontaneously breathing  Level of Consciousness: drowsy  Oxygen Supplementation: room air    Independent Airway: airway patency satisfactory and stable  Dentition: dentition unchanged  Vital Signs Stable: post-procedure vital signs reviewed and stable  Report to RN Given: handoff report given  Patient transferred to: Phase II    Handoff Report: Identifed the Patient, Identified the Reponsible Provider, Reviewed the pertinent medical history, Discussed the surgical course, Reviewed Intra-OP anesthesia mangement and issues during anesthesia, Set expectations for post-procedure period and Allowed opportunity for questions and acknowledgement of understanding      Vitals:  Vitals Value Taken Time   /77 04/28/25 1000   Temp 96.3  F (35.7  C) 04/28/25 1000   Pulse     Resp 16 04/28/25 1000   SpO2 100 % 04/28/25 1000       Electronically Signed By: ROMULO Ivey CRNA  April 28, 2025  10:01 AM

## 2025-04-28 NOTE — INTERVAL H&P NOTE
"I have reviewed the surgical (or preoperative) H&P that is linked to this encounter, and examined the patient. There are no significant changes    Behzad Morgan MD, [facility]  Office: [phone]  [facility]   General and Bariatric Surgery      Clinical Conditions Present on Arrival:  Clinically Significant Risk Factors Present on Admission                       # Overweight: Estimated body mass index is 29.04 kg/m  as calculated from the following:    Height as of this encounter: 1.727 m (5' 8\").    Weight as of this encounter: 86.6 kg (191 lb).       "

## 2025-04-28 NOTE — ANESTHESIA PREPROCEDURE EVALUATION
Anesthesia Pre-Procedure Evaluation    Patient: Eri Forbes   MRN: [id number] : 1980        Procedure : Procedure(s):  COLONOSCOPY          Past Medical History:   Diagnosis Date    Anxiety     Depression     History of smoking     15 pack-year history    Motion sickness       Past Surgical History:   Procedure Laterality Date    ABDOMEN SURGERY      Incision abdominally to remove Ovaries    ZZC REMOVAL OF OVARY(S)      Description: Oophorectomy Unilateral Right Side;  Recorded: 2014;      No Known Allergies   Social History     Tobacco Use    Smoking status: Former    Smokeless tobacco: Never    Tobacco comments:     1 ppd for 15 years, quit    Substance Use Topics    Alcohol use: Yes     Alcohol/week: 0.0 - 1.0 standard drinks of alcohol      Wt Readings from Last 1 Encounters:   25 86.6 kg (191 lb)        Anesthesia Evaluation   Pt has had prior anesthetic.     History of anesthetic complications  - motion sickness.      ROS/MED HX  ENT/Pulmonary:    (-) tobacco use, asthma, COPD, sleep apnea, JEREMY risk factors and recent URI   Neurologic:    (-) no seizures and no CVA   Cardiovascular:     (+)  hypertension- -   -  - -                                   (-) taking anticoagulants/antiplatelets, syncope and arrhythmias   METS/Exercise Tolerance: >4 METS    Hematologic:    (-) anemia   Musculoskeletal:       GI/Hepatic:    (-) GERD   Renal/Genitourinary:    (-) renal disease   Endo: Comment: Semaglutide, last dose **    (+)               Obesity,    (-) Type I DM, Type II DM and thyroid disease   Psychiatric/Substance Use:     (+) psychiatric history depression    (-) chronic opioid use history   Infectious Disease:    (-) Recent Fever   Malignancy:       Other:            Physical Exam    Airway        Mallampati: II    Neck ROM: full   Mouth opening: > 3 cm    Respiratory Devices and Support         Dental       (+) Completely normal teeth      Cardiovascular          Rhythm and  "rate: regular     Pulmonary   pulmonary exam normal                OUTSIDE LABS:  CBC:   Lab Results   Component Value Date    WBC 10.7 03/20/2024    WBC 9.1 06/16/2022    HGB 13.2 03/20/2024    HGB 12.9 06/16/2022    HCT 39.7 03/20/2024    HCT 39.3 06/16/2022     03/20/2024     06/16/2022     BMP:   Lab Results   Component Value Date     03/20/2024     06/16/2022    POTASSIUM 4.4 03/20/2024    POTASSIUM 4.1 06/16/2022    CHLORIDE 104 03/20/2024    CHLORIDE 106 06/16/2022    CO2 19 (L) 03/20/2024    CO2 21 (L) 06/16/2022    BUN 16.1 03/20/2024    BUN 14 06/16/2022    CR 0.86 03/20/2024    CR 0.84 06/16/2022     (H) 03/20/2024     06/16/2022     COAGS: No results found for: \"PTT\", \"INR\", \"FIBR\"  POC: No results found for: \"BGM\", \"HCG\", \"HCGS\"  HEPATIC:   Lab Results   Component Value Date    ALBUMIN 4.6 03/20/2024    PROTTOTAL 7.8 03/20/2024    ALT 36 03/20/2024    AST 30 03/20/2024    ALKPHOS 92 03/20/2024    BILITOTAL 0.3 03/20/2024     OTHER:   Lab Results   Component Value Date    A1C 5.5 03/26/2025    ELIZABETH 9.8 03/20/2024    TSH 2.69 03/26/2025       Anesthesia Plan    ASA Status:  2    NPO Status:  NPO Appropriate    Anesthesia Type: MAC.              Consents    Anesthesia Plan(s) and associated risks, benefits, and realistic alternatives discussed. Questions answered and patient/representative(s) expressed understanding.     - Discussed:     - Discussed with:  Patient      - Extended Intubation/Ventilatory Support Discussed: No.      - Patient is DNR/DNI Status: No     Use of blood products discussed: No .     Postoperative Care            Comments:               Rhea Lindsay MD    Clinically Significant Risk Factors Present on Admission                             # Overweight: Estimated body mass index is 29.04 kg/m  as calculated from the following:    Height as of this encounter: 1.727 m (5' 8\").    Weight as of this encounter: 86.6 kg (191 lb).          "

## 2025-04-28 NOTE — DISCHARGE INSTRUCTIONS
If you have any questions or concerns regarding your procedure please contact Dr. Morgan, his office number is [phone]     Discharge Instructions:    Take you medications as directed and follow up with you primary provider as scheduled.   You should expect to pass air from your rectum after the procedure.   Follow these care guidelines during your recovery for the next 24 hours.   If you have any questions or concerns please contact the provider that performed your procedure.     You were given medicine for sedation:  You have been given medicines during your procedure that might make you sleepy and weak. To prevent problems:    *Rest for the rest of the day after you are home. You should be back to your normal activity tomorrow.  *For the next 24 hours:   -Do not drink alcoholic beverages.   -Do not make any important decisions or sign any legal forms.   -Do not work around machinery or power equipment.     The medicines used for sedation may make you feel nauseated.   *Start with clear liquids, such as tea, jell-o, broth and ginger ale. As you feel better you may add soft foods such as pudding and ice cream.   *When you no longer feel nauseated, you may try your normal diet.     You should be back to eating your normal after 24 hours.     Call if you have any of these problems:  *Fever of 101 degree F or 38 degrees C  *Bleeding from the rectum  *Black stool or blood in your bowel movements  *Nausea with vomiting that does not ease after a few hours.  *Abdominal pain or bloating  *Fainting

## 2025-08-08 ENCOUNTER — ANCILLARY PROCEDURE (OUTPATIENT)
Dept: MAMMOGRAPHY | Facility: CLINIC | Age: 45
End: 2025-08-08
Attending: FAMILY MEDICINE
Payer: COMMERCIAL

## 2025-08-08 DIAGNOSIS — Z12.31 VISIT FOR SCREENING MAMMOGRAM: ICD-10-CM

## 2025-08-08 PROCEDURE — 77063 BREAST TOMOSYNTHESIS BI: CPT
